# Patient Record
Sex: MALE | Race: WHITE
[De-identification: names, ages, dates, MRNs, and addresses within clinical notes are randomized per-mention and may not be internally consistent; named-entity substitution may affect disease eponyms.]

---

## 2018-02-23 ENCOUNTER — HOSPITAL ENCOUNTER (EMERGENCY)
Dept: HOSPITAL 83 - ED | Age: 78
Discharge: HOME | End: 2018-02-23
Payer: COMMERCIAL

## 2018-02-23 VITALS — WEIGHT: 232 LBS | BODY MASS INDEX: 35.16 KG/M2 | HEIGHT: 67.99 IN

## 2018-02-23 DIAGNOSIS — Z98.890: ICD-10-CM

## 2018-02-23 DIAGNOSIS — Z98.42: ICD-10-CM

## 2018-02-23 DIAGNOSIS — Z79.899: ICD-10-CM

## 2018-02-23 DIAGNOSIS — Z98.41: ICD-10-CM

## 2018-02-23 DIAGNOSIS — J31.0: Primary | ICD-10-CM

## 2018-02-28 ENCOUNTER — HOSPITAL ENCOUNTER (INPATIENT)
Dept: HOSPITAL 83 - ED | Age: 78
LOS: 8 days | Discharge: HOME | DRG: 885 | End: 2018-03-08
Attending: PSYCHIATRY & NEUROLOGY | Admitting: PSYCHIATRY & NEUROLOGY
Payer: COMMERCIAL

## 2018-02-28 VITALS — SYSTOLIC BLOOD PRESSURE: 156 MMHG | DIASTOLIC BLOOD PRESSURE: 62 MMHG

## 2018-02-28 VITALS — DIASTOLIC BLOOD PRESSURE: 92 MMHG | SYSTOLIC BLOOD PRESSURE: 156 MMHG

## 2018-02-28 VITALS — HEIGHT: 65.47 IN | BODY MASS INDEX: 37.12 KG/M2 | WEIGHT: 225.5 LBS

## 2018-02-28 VITALS — DIASTOLIC BLOOD PRESSURE: 100 MMHG

## 2018-02-28 VITALS — DIASTOLIC BLOOD PRESSURE: 62 MMHG | SYSTOLIC BLOOD PRESSURE: 156 MMHG

## 2018-02-28 DIAGNOSIS — K64.9: ICD-10-CM

## 2018-02-28 DIAGNOSIS — J30.9: ICD-10-CM

## 2018-02-28 DIAGNOSIS — Z79.899: ICD-10-CM

## 2018-02-28 DIAGNOSIS — Z98.42: ICD-10-CM

## 2018-02-28 DIAGNOSIS — I10: ICD-10-CM

## 2018-02-28 DIAGNOSIS — R39.12: ICD-10-CM

## 2018-02-28 DIAGNOSIS — N40.1: ICD-10-CM

## 2018-02-28 DIAGNOSIS — F41.9: ICD-10-CM

## 2018-02-28 DIAGNOSIS — E44.0: ICD-10-CM

## 2018-02-28 DIAGNOSIS — D53.9: ICD-10-CM

## 2018-02-28 DIAGNOSIS — E87.6: ICD-10-CM

## 2018-02-28 DIAGNOSIS — R74.0: ICD-10-CM

## 2018-02-28 DIAGNOSIS — Z83.3: ICD-10-CM

## 2018-02-28 DIAGNOSIS — F33.2: Primary | ICD-10-CM

## 2018-02-28 DIAGNOSIS — N39.0: ICD-10-CM

## 2018-02-28 DIAGNOSIS — Z98.41: ICD-10-CM

## 2018-02-28 LAB
ALBUMIN SERPL-MCNC: 2.7 GM/DL (ref 3.1–4.5)
ALP SERPL-CCNC: 101 U/L (ref 45–117)
ALT SERPL W P-5'-P-CCNC: 59 U/L (ref 12–78)
AMPHETAMINES UR QL SCN: < 1000
APAP SERPL-MCNC: < 2 UG/ML (ref 10–30)
APPEARANCE UR: (no result)
AST SERPL-CCNC: 37 IU/L (ref 3–35)
BACTERIA #/AREA URNS HPF: (no result) /[HPF]
BARBITURATES UR QL SCN: < 200
BASOPHILS # BLD AUTO: 0 10*3/UL (ref 0–0.1)
BASOPHILS NFR BLD AUTO: 0.2 % (ref 0–1)
BENZODIAZ UR QL SCN: < 200
BILIRUB UR QL STRIP: NEGATIVE
BUN SERPL-MCNC: 22 MG/DL (ref 7–24)
BZE UR QL SCN: < 300
CANNABINOIDS UR QL SCN: < 50
CHLORIDE SERPL-SCNC: 104 MMOL/L (ref 98–107)
COLOR UR: YELLOW
CREAT SERPL-MCNC: 1.07 MG/DL (ref 0.7–1.3)
EOSINOPHIL # BLD AUTO: 0 10*3/UL (ref 0–0.4)
EOSINOPHIL # BLD AUTO: 0.3 % (ref 1–4)
EPI CELLS #/AREA URNS HPF: (no result) /[HPF]
ERYTHROCYTE [DISTWIDTH] IN BLOOD BY AUTOMATED COUNT: 14.8 % (ref 0–14.5)
ETHANOL SERPL-MCNC: < 3 MG/DL (ref ?–3)
GLUCOSE UR QL: NEGATIVE
HCT VFR BLD AUTO: 34.9 % (ref 42–52)
HGB BLD-MCNC: 11.2 G/DL (ref 14–18)
HGB UR QL STRIP: NEGATIVE
KETONES UR QL STRIP: NEGATIVE
LEUKOCYTE ESTERASE UR QL STRIP: (no result)
LYMPHOCYTES # BLD AUTO: 1.2 10*3/UL (ref 1.3–4.4)
LYMPHOCYTES NFR BLD AUTO: 13.4 % (ref 27–41)
MCH RBC QN AUTO: 33.7 PG (ref 27–31)
MCHC RBC AUTO-ENTMCNC: 32.1 G/DL (ref 33–37)
MCV RBC AUTO: 105.1 FL (ref 80–94)
METHADONE UR QL SCN: < 300
MONOCYTES # BLD AUTO: 0.4 10*3/UL (ref 0.1–1)
MONOCYTES NFR BLD MANUAL: 4.8 % (ref 3–9)
NEUT #: 7 10*3/UL (ref 2.3–7.9)
NEUT %: 80.6 % (ref 47–73)
NITRITE UR QL STRIP: NEGATIVE
NRBC BLD QL AUTO: 0 10*3/UL (ref 0–0)
OPIATES UR QL SCN: < 300
PCP UR QL SCN: <  25
PH UR STRIP: 6 [PH] (ref 5–9)
PLATELET # BLD AUTO: 284 10*3/UL (ref 130–400)
PMV BLD AUTO: 9.4 FL (ref 9.6–12.3)
POTASSIUM SERPL-SCNC: 4.2 MMOL/L (ref 3.5–5.1)
PROT SERPL-MCNC: 7.6 GM/DL (ref 6.4–8.2)
RBC # BLD AUTO: 3.32 10*6/UL (ref 4.5–5.9)
RBC #/AREA URNS HPF: (no result) RBC/HPF (ref 0–2)
SODIUM SERPL-SCNC: 138 MMOL/L (ref 136–145)
SP GR UR: 1.02 (ref 1–1.03)
TSH SERPL DL<=0.005 MIU/L-ACNC: 1.04 UIU/ML (ref 0.36–4.75)
UROBILINOGEN UR STRIP-MCNC: 0.2 E.U./DL (ref 0.2–1)
WBC #/AREA URNS HPF: (no result) WBC/HPF (ref 0–5)
WBC NRBC COR # BLD AUTO: 8.7 10*3/UL (ref 4.8–10.8)

## 2018-03-01 VITALS — DIASTOLIC BLOOD PRESSURE: 82 MMHG

## 2018-03-01 VITALS — SYSTOLIC BLOOD PRESSURE: 149 MMHG | DIASTOLIC BLOOD PRESSURE: 62 MMHG

## 2018-03-01 LAB
25(OH)D3 SERPL-MCNC: 23 NG/ML (ref 30–100)
BASOPHILS # BLD AUTO: 0.1 10*3/UL (ref 0–0.1)
BASOPHILS NFR BLD AUTO: 0.6 % (ref 0–1)
BUN SERPL-MCNC: 19 MG/DL (ref 7–24)
CHLORIDE SERPL-SCNC: 103 MMOL/L (ref 98–107)
CHOLEST SERPL-MCNC: 119 MG/DL (ref ?–200)
CREAT SERPL-MCNC: 1.04 MG/DL (ref 0.7–1.3)
EOSINOPHIL # BLD AUTO: 0.2 10*3/UL (ref 0–0.4)
EOSINOPHIL # BLD AUTO: 1.9 % (ref 1–4)
ERYTHROCYTE [DISTWIDTH] IN BLOOD BY AUTOMATED COUNT: 14.9 % (ref 0–14.5)
HCT VFR BLD AUTO: 37.5 % (ref 42–52)
HDLC SERPL-MCNC: 43 MG/DL (ref 40–60)
HGB BLD-MCNC: 12.2 G/DL (ref 14–18)
LDLC SERPL DIRECT ASSAY-MCNC: 60 MG/DL (ref 9–159)
LYMPHOCYTES # BLD AUTO: 1.8 10*3/UL (ref 1.3–4.4)
LYMPHOCYTES NFR BLD AUTO: 20.7 % (ref 27–41)
MCH RBC QN AUTO: 34.2 PG (ref 27–31)
MCHC RBC AUTO-ENTMCNC: 32.5 G/DL (ref 33–37)
MCV RBC AUTO: 105 FL (ref 80–94)
MONOCYTES # BLD AUTO: 0.4 10*3/UL (ref 0.1–1)
MONOCYTES NFR BLD MANUAL: 5 % (ref 3–9)
NEUT #: 6.1 10*3/UL (ref 2.3–7.9)
NEUT %: 71 % (ref 47–73)
NRBC BLD QL AUTO: 0 % (ref 0–0)
PLATELET # BLD AUTO: 304 10*3/UL (ref 130–400)
PMV BLD AUTO: 9.7 FL (ref 9.6–12.3)
POTASSIUM SERPL-SCNC: 4.2 MMOL/L (ref 3.5–5.1)
RBC # BLD AUTO: 3.57 10*6/UL (ref 4.5–5.9)
SODIUM SERPL-SCNC: 141 MMOL/L (ref 136–145)
TRIGL SERPL-MCNC: 81 MG/DL (ref ?–150)
VITAMIN B12: 454 PG/ML (ref 247–911)
VLDLC SERPL CALC-MCNC: 16 MG/DL (ref 6–40)
WBC NRBC COR # BLD AUTO: 8.6 10*3/UL (ref 4.8–10.8)

## 2018-03-02 VITALS — DIASTOLIC BLOOD PRESSURE: 61 MMHG | SYSTOLIC BLOOD PRESSURE: 136 MMHG

## 2018-03-02 VITALS — DIASTOLIC BLOOD PRESSURE: 63 MMHG

## 2018-03-02 PROCEDURE — BD1BYZZ FLUOROSCOPY OF MOUTH/OROPHARYNX USING OTHER CONTRAST: ICD-10-PCS | Performed by: INTERNAL MEDICINE

## 2018-03-03 VITALS — DIASTOLIC BLOOD PRESSURE: 62 MMHG

## 2018-03-03 VITALS — SYSTOLIC BLOOD PRESSURE: 132 MMHG | DIASTOLIC BLOOD PRESSURE: 77 MMHG

## 2018-03-04 VITALS — DIASTOLIC BLOOD PRESSURE: 78 MMHG | SYSTOLIC BLOOD PRESSURE: 108 MMHG

## 2018-03-04 VITALS — SYSTOLIC BLOOD PRESSURE: 124 MMHG | DIASTOLIC BLOOD PRESSURE: 58 MMHG

## 2018-03-05 VITALS — DIASTOLIC BLOOD PRESSURE: 763 MMHG

## 2018-03-05 VITALS — SYSTOLIC BLOOD PRESSURE: 119 MMHG | DIASTOLIC BLOOD PRESSURE: 60 MMHG

## 2018-03-06 VITALS — DIASTOLIC BLOOD PRESSURE: 62 MMHG

## 2018-03-06 VITALS — DIASTOLIC BLOOD PRESSURE: 70 MMHG

## 2018-03-07 VITALS — DIASTOLIC BLOOD PRESSURE: 68 MMHG

## 2018-03-07 VITALS — DIASTOLIC BLOOD PRESSURE: 65 MMHG | SYSTOLIC BLOOD PRESSURE: 152 MMHG

## 2018-03-08 VITALS — DIASTOLIC BLOOD PRESSURE: 67 MMHG | SYSTOLIC BLOOD PRESSURE: 150 MMHG

## 2019-01-10 ENCOUNTER — HOSPITAL ENCOUNTER (INPATIENT)
Dept: HOSPITAL 83 - ED | Age: 79
LOS: 20 days | Discharge: NURSING, REHAB OR LONG-TERM CARE | DRG: 885 | End: 2019-01-30
Attending: PSYCHIATRY & NEUROLOGY | Admitting: PSYCHIATRY & NEUROLOGY
Payer: COMMERCIAL

## 2019-01-10 VITALS — DIASTOLIC BLOOD PRESSURE: 55 MMHG

## 2019-01-10 VITALS — SYSTOLIC BLOOD PRESSURE: 124 MMHG | DIASTOLIC BLOOD PRESSURE: 54 MMHG

## 2019-01-10 VITALS — DIASTOLIC BLOOD PRESSURE: 54 MMHG

## 2019-01-10 VITALS — DIASTOLIC BLOOD PRESSURE: 80 MMHG | SYSTOLIC BLOOD PRESSURE: 120 MMHG

## 2019-01-10 VITALS — DIASTOLIC BLOOD PRESSURE: 82 MMHG

## 2019-01-10 VITALS — DIASTOLIC BLOOD PRESSURE: 84 MMHG | SYSTOLIC BLOOD PRESSURE: 120 MMHG

## 2019-01-10 VITALS — DIASTOLIC BLOOD PRESSURE: 84 MMHG

## 2019-01-10 DIAGNOSIS — N40.0: ICD-10-CM

## 2019-01-10 DIAGNOSIS — I10: ICD-10-CM

## 2019-01-10 DIAGNOSIS — F34.1: ICD-10-CM

## 2019-01-10 DIAGNOSIS — G30.9: ICD-10-CM

## 2019-01-10 DIAGNOSIS — F33.2: Primary | ICD-10-CM

## 2019-01-10 DIAGNOSIS — Z83.3: ICD-10-CM

## 2019-01-10 DIAGNOSIS — K57.90: ICD-10-CM

## 2019-01-10 DIAGNOSIS — Z98.41: ICD-10-CM

## 2019-01-10 DIAGNOSIS — R73.03: ICD-10-CM

## 2019-01-10 DIAGNOSIS — Z79.899: ICD-10-CM

## 2019-01-10 DIAGNOSIS — E55.9: ICD-10-CM

## 2019-01-10 DIAGNOSIS — D53.9: ICD-10-CM

## 2019-01-10 DIAGNOSIS — E44.0: ICD-10-CM

## 2019-01-10 DIAGNOSIS — F02.80: ICD-10-CM

## 2019-01-10 DIAGNOSIS — R73.9: ICD-10-CM

## 2019-01-10 DIAGNOSIS — F41.9: ICD-10-CM

## 2019-01-10 DIAGNOSIS — Z98.42: ICD-10-CM

## 2019-01-10 LAB
ALBUMIN SERPL-MCNC: 2.9 GM/DL (ref 3.1–4.5)
ALP SERPL-CCNC: 94 U/L (ref 45–117)
ALT SERPL W P-5'-P-CCNC: 39 U/L (ref 12–78)
AMPHETAMINES UR QL SCN: < 1000
APPEARANCE UR: CLEAR
AST SERPL-CCNC: 31 IU/L (ref 3–35)
BARBITURATES UR QL SCN: < 200
BASOPHILS # BLD AUTO: 0 10*3/UL (ref 0–0.1)
BASOPHILS NFR BLD AUTO: 0.4 % (ref 0–1)
BENZODIAZ UR QL SCN: < 200
BILIRUB UR QL STRIP: NEGATIVE
BUN SERPL-MCNC: 24 MG/DL (ref 7–24)
BZE UR QL SCN: < 300
CANNABINOIDS UR QL SCN: < 50
CHLORIDE SERPL-SCNC: 102 MMOL/L (ref 98–107)
COLOR UR: YELLOW
CREAT SERPL-MCNC: 1.03 MG/DL (ref 0.7–1.3)
EOSINOPHIL # BLD AUTO: 0 10*3/UL (ref 0–0.4)
EOSINOPHIL # BLD AUTO: 0.4 % (ref 1–4)
EPI CELLS #/AREA URNS HPF: (no result) /[HPF]
ERYTHROCYTE [DISTWIDTH] IN BLOOD BY AUTOMATED COUNT: 14.7 % (ref 0–14.5)
GLUCOSE UR QL: NEGATIVE
HCT VFR BLD AUTO: 37 % (ref 42–52)
HGB BLD-MCNC: 12.1 G/DL (ref 14–18)
HGB UR QL STRIP: NEGATIVE
KETONES UR QL STRIP: NEGATIVE
LEUKOCYTE ESTERASE UR QL STRIP: NEGATIVE
LYMPHOCYTES # BLD AUTO: 1 10*3/UL (ref 1.3–4.4)
LYMPHOCYTES NFR BLD AUTO: 14.9 % (ref 27–41)
MCH RBC QN AUTO: 34.6 PG (ref 27–31)
MCHC RBC AUTO-ENTMCNC: 32.7 G/DL (ref 33–37)
MCV RBC AUTO: 105.7 FL (ref 80–94)
METHADONE UR QL SCN: < 300
MONOCYTES # BLD AUTO: 0.4 10*3/UL (ref 0.1–1)
MONOCYTES NFR BLD MANUAL: 6.1 % (ref 3–9)
NEUT #: 5.4 10*3/UL (ref 2.3–7.9)
NEUT %: 77.6 % (ref 47–73)
NITRITE UR QL STRIP: NEGATIVE
NRBC BLD QL AUTO: 0 10*3/UL (ref 0–0)
OPIATES UR QL SCN: < 300
PCP UR QL SCN: <  25
PH UR STRIP: 7 [PH] (ref 5–9)
PLATELET # BLD AUTO: 215 10*3/UL (ref 130–400)
PMV BLD AUTO: 9.7 FL (ref 9.6–12.3)
POTASSIUM SERPL-SCNC: 3.9 MMOL/L (ref 3.5–5.1)
PROT SERPL-MCNC: 7.8 GM/DL (ref 6.4–8.2)
RBC # BLD AUTO: 3.5 10*6/UL (ref 4.5–5.9)
SODIUM SERPL-SCNC: 136 MMOL/L (ref 136–145)
SP GR UR: 1.01 (ref 1–1.03)
UROBILINOGEN UR STRIP-MCNC: 1 E.U./DL (ref 0.2–1)
WBC #/AREA URNS HPF: (no result) WBC/HPF (ref 0–5)
WBC NRBC COR # BLD AUTO: 7 10*3/UL (ref 4.8–10.8)

## 2019-01-10 NOTE — NUR
MEDICATION LIST VERIFIED WITH DUSTY AT [facility]IA [facility]UI[facility].

## 2019-01-10 NOTE — PR
[place], [place]
 
                                      PROGRESS NOTE
 
        NAME: SHOLA FELIZ                 ACCT #: [id number]  
        UNIT #: [id number]                       ROOM: 311       
        DOCTOR: MINDY WHALEN MD              BIRTHDATE: [birth year]
 
 
DOS: 01/21/2019
 
INTERVAL NOTE
 
CHIEF COMPLAINT:  "I think I am feeling better, thank you."
 
SUMMARY OF THE VISIT:  The patient was interviewed as he was sitting in the
dining area.  He engaged readily in conversation.  He did report that he is
still very concerned about his wife and did not talk to her for a day or so. 
Nurses did report that she is not doing well.  This is very problematic as he is
very concerned for her wellbeing and is fearful of how he will react should she
die.  He is still voicing some thoughts of wanting to end it all if she should
indeed pass.  He does report though that the Ativan during the day is helping
his anxiety level, so he is not worrying as much.  He is also tolerating the
current medication regimen well.
 
MENTAL STATUS:  He is alert and oriented with time gaps.  Mood does seem to be
strongly trending towards euthymia, but is still somewhat suspect given the
circumstances.  There is no alfred or hypomania.  There is no gross psychosis. 
Short-term memory continues to be problematic.
 
PLAN:  I will go ahead and maximize out his Namenda, bringing it from 5 mg
b.i.d. to 10 mg b.i.d., support and monitor, engage in individual and chery
milieu activity, returning then to the least restrictive environment when
psychiatrically stable.
 
 
 
_________________________________
MINDY WHALEN MD
 
CM:PNTRANS
 
D: 01/21/19 1045                 
T: 01/21/19 1143
             
                                                            
MINDY WHALEN MD              
                                                            
01/21/19
1144
                              
interface

## 2019-01-10 NOTE — PR
[place], [place]
 
                                      PROGRESS NOTE
 
        NAME: SHOLA FELIZ                 ACCT #: [id number]  
        UNIT #: [id number]                       ROOM: 311       
        DOCTOR: ELLIE DUVAL CNP               BIRTHDATE: [birth year]
 
 
DOS: 01/20/2019
 
CHIEF COMPLAINT:  "I still feel sad at times."
 
SUMMARY OF THE VISIT:  The patient was interviewed in the dining room.  He
engaged readily in conversation with me.  He reports that he feels sleepy today.
 He feels that his mood is about the same.  He feels that his nerves have
somewhat improved.  Staff reports that he notices that the patient was somewhat
drowsy; however, he has been exhibiting no behaviors.  His appetite has been
okay.
 
MENTAL STATUS EXAMINATION:  The patient is alert and oriented.  He is pleasant
and cooperative with me.  No alfred or hypomania.  No delusions or paranoia.  No
psychotic symptoms.  No auditory or visual hallucinations.  His mood seems to be
trending towards euthymia.  His affect was congruent with mood.
 
PLAN:  Due to the patient having some drowsiness, it was decided to decrease the
patient's Ativan from 1 mg 3 times a day to 0.5 mg 4 times a day.  We will
continue to monitor the patient for effectiveness and for side effects.  We will
continue to encourage the patient to engage in individual and chery milieu
activity.  Continue fall and safety precautions.  Plan to return the patient to
the least restrictive environment once he is considered psychiatrically stable.
 
 
 
_________________________________
Ellie Duval CNP
 
CM:PNTRANS
 
D: 01/20/19 1452                 
T: 01/21/19 0017
             
                                                            
ELLIE DUVAL CNP               
                                                            
01/21/19
0918
                              
interface

## 2019-01-10 NOTE — PR
[place], [place]
 
                                      PROGRESS NOTE
 
        NAME: SHOLA FELIZ                 ACCT #: [id number]  
        UNIT #: [id number]                       ROOM: 311       
        DOCTOR: MINDY WHALEN MD              BIRTHDATE: [birth year]
 
 
DOS: 01/28/2019
 
CHIEF COMPLAINT:  "I feel little queasy in my belly."
 
SUMMARY OF THE VISIT:  The patient was interviewed as he was sitting, sipping
ginger ale.  He did report that he was feeling a little nauseated.  I discussed
with him how his weekend went and he smiled and nodded in approval.  I also told
him that there was a work in progress to attempt to get him admitted to [facility] in [place], so he could be with his wife.  He then smiled with a big
smile, I have seen him smile since he has been here.  He nodded in approval and
thanked me for assisting him.
 
MENTAL STATUS:  He is alert and oriented with some time gaps.  Mood does seem to
be strongly trending towards euthymia.  Affect is much more appropriate.  There
was no alfred or hypomania or psychosis noted.  Short term memory continues to be
poor, otherwise he is intact.
 
PLAN:  I will maintain his current psychotropic regimen.  Social service is
diligently working to attempt to get him to be admitted, so he can be with his
wife.  We will continue efforts along these ways, discharging then when
psychiatrically stable.
 
 
 
_________________________________
MINDY WHALEN MD
 
CM:PNTRANS
 
D: 01/28/19 0847                 
T: 01/28/19 1011
             
                                                            
MINDY WHALEN MD              
                                                            
01/28/19
1012
                              
interface

## 2019-01-10 NOTE — PR
[place], [place]
 
                                      PROGRESS NOTE
 
        NAME: SHOLA FELIZ                 ACCT #: [id number]  
        UNIT #: [id number]                       ROOM: 311       
        DOCTOR: MINDY WHALEN MD              BIRTHDATE: [birth year]
 
 
DOS: 01/22/2019
 
CHIEF COMPLAINT:  "I think I'm feeling better, my wife though is sick."
 
SUMMARY OF THE VISIT:  The patient was interviewed in the dining area, where he
was sitting with male peers.  He stopped and engaged in conversation with me. 
He does report that he is feeling somewhat better, but is still very concerned
about his wife.  He does not seem to totally grasp the severity of her illness
and states that she is very prone to developing cold and is ill because of that.
 In reality, she is on dialysis in an intensive care at [facility]. 
Family is very concerned about his reaction to her illness and possible further
decline.  He does seem to be calmer, however, and his depression does seem to be
somewhat under control.  He convincingly denies medication side effects.
 
MENTAL STATUS:  He is alert and oriented with time gaps.  Mood does seem to be
trending towards euthymia.  Affect is more appropriate.  There is no alfred,
hypomania or psychosis noted.  Short term memory has gaps.
 
PLAN:  I will maintain his current psychotropic regimen.  I have maxed out the
dose of both the Namenda and the Exelon, both impacting positively on ADL,
behavior, and cognition.  He is also maxed out on the Cymbalta dose at 60 mg
twice daily and has a reasonable dose of Ativan on board 0.5 mg 4 times daily
without the presence of any sedation or somnolence.  We will continue to support
and redirect, engage in individual and chery milieu activity, pastoral counseling
has been consulted as has Rebekah Betancourt, psychologist.
 
 
 
_________________________________
MINDY WHALEN MD
 
CM:PNTRANS
 
D: 01/22/19 0938                 
T: 01/22/19 1308
             
                                                            
MINDY WHALEN MD              
                                                            
01/22/19
1309
                              
interface

## 2019-01-10 NOTE — NUR
SHOLA FELIZ a 78 year old M admitted via
wheel chair from the EMERGENCY ROOM as a voluntary admission.
Arrived on unit at 1532.
ALLERGIES: NKDA.
Vital signs are: 97.8-75-18 124/54.
The client signed the following forms with stated understanding: Authorization
For The Release of Medical Information, Clothing List, Consent to Voluntary
Admission and Hospitalization, Consent and Release Forms/Receipt of Rights,
Acknowledgement of Advance Directive Information, Behavioral Health Consent
Form, and Informed Consent of Medications. [facility]. A search was conducted and hazardous articles were removed.
Client was oriented to the unit.
STARR GIBSON

## 2019-01-10 NOTE — PR
[place], [place]
 
                                      PROGRESS NOTE
 
        NAME: SHOLA FELIZ                 ACCT #: [id number]  
        UNIT #: [id number]                       ROOM: 311       
        DOCTOR: MINDY WHALEN MD              BIRTHDATE: [birth year]
 
 
DOS: 01/15/2019
 
CHIEF COMPLAINT:  "Oh, I just get so anxious and I just don't feel much like
doing things."
 
SUMMARY OF THE VISIT:  The patient was interviewed in the group therapy room. 
He was working with physical therapy initially and did seem to be following
commands fairly well with some minor difficulty noted.  As I engaged him in
conversation, he did experience significant word finding difficulty and did
outwardly appear anxious and quite nervous.  Some of this I do believe is
because of his cognitive slippage.  He does continue to endorse depression as
well as the anxiety.  He also reports no side effects from the medicines
themselves.
 
MENTAL STATUS:  He is alert and oriented to person, place, and approximate to
time.  Mood does still seem to be depressed with anxious overtones.  There is no
hypomania or alfred.  There are no active auditory or visual hallucinations.  No
delusions were voiced.  No paranoia is present.  Short-term memory continues to
be somewhat problematic.
 
PLAN:  I will maximize out the dose of Cymbalta bringing the total dose up to 60
mg twice daily.  Likewise, I will increase his Vistaril from 50 mg 3 times a day
to 50 mg 4 times a day in an effort to control his anxiety level.  Given the
fact that there is some significant cognitive loss, I will add Exelon patch 4.6
mg daily to help maintain or improve ADLs, behavior and cognition.  We will
continue to engage in individual and chery milieu activity with the ultimate plan
to return to the least restrictive environment when psychiatrically stable.
 
 
 
_________________________________
MINDY WHALEN MD
 
CM:PNTRANS
 
D: 01/15/19 0931                 
T: 01/16/19 0403
             
                                                            
MINDY WHALEN MD              
                                                            
01/16/19
0526
                              
interface

## 2019-01-10 NOTE — NUR
P-INCREASED ANXIETY AND PERIODS OF CONFUSION. PATIENT WITH PERIODS OF SHORT
TERM AND LONG TERM MEMORY DEFICITS. PATIENT VERBALIZED FEELING ANXIOUS AND
STATING "I HAVE BEEN FEELING LIKE THIS FOR QUITE SOME TIME". NO HALLUCINATIONS
OR DELUSIONS. NO HOMICIDAL OR SUICIDAL IDEATIONS.
I-REDIRECTION WITH 1:1 INTERACTION AND PRESENT REALITY ORIENTATION. EDUCATE
AND ENCOURAGE MEDICATION COMPLIANCE. DISCUSS TRIGGERS AND COPING SKILLS FOR
EPISODES OF ANXIETY
R-PATIENT REDIRECTABLE WITH 1:1 INTERACTION AND PRESENTING REALITY. PATIENT
VERBALIZED "MISSING HIS WIFE BECAUSE SHE WAS IN A NURSING HOME. PATIENT
MEDICATION COMPLIANT. PATIENT AFFECT IS SAD. PATIENT IS CALM, PURPOSEFUL,
ISOLATIVE, AND WITHDRAWN. PATIENT IS INTERACTIVE AT TIMES. PATIENT IS
AMBULATORY ON UNIT WITH STEADY GAIT. PATIENT PROVIDED NOURISHMENT, FLUIDS, AND
TOILETING. PATIENT CONTINENT OF BLADDER AT THIS TIME
P-CONTINUE TO MEDICATION COMPLIANCE, CONTINUE TO PRESENT REALITY, ENCOURAGE
GROUP THERAPY WHILE AWAKE

## 2019-01-10 NOTE — PR
[place], [place]
 
                                      PROGRESS NOTE
 
        NAME: SHOLA FELIZ                 ACCT #: [id number]  
        UNIT #: [id number]                       ROOM: 311       
        DOCTOR: MINDY WHALEN MD              BIRTHDATE: [birth year]
 
 
DOS: 01/17/2019
 
CHIEF COMPLAINT:  "Oh, I am just so anxious, I do not do really well for
breakfast."
 
SUMMARY OF THE VISIT:  The patient was interviewed as he was eating his
breakfast.  He had about half of his breakfast eaten.  He did engage readily in
conversation and reported to me that he is very concerned about his wife.  He is
aware that she is in the hospital upstairs.  I am not clear if he realizes she
is in intensive care.  He does report ongoing anxiety and depression.  The
current medication regimen has been ineffective at relieving his anxiety.  He is
sleeping better, however, with the current medication regimen.
 
MENTAL STATUS:  He is alert and oriented with time gaps.  Mood does seem to be
trending towards euthymia, but still is depressed with anxious overtones.  There
is no hypomania or alfred.  There is no gross psychosis.  Short term memory
continues to be problematic.
 
PLAN:  I will discontinue his Vistaril in lieu of Ativan 1 mg t.i.d.  I will
monitor for risk, benefit and excess sedation and somnolence.  I will maximize
out his dose of Exelon patch, bringing it from 9.5-13.3 mg daily.  We will
engage in individual and chery milieu activity.  We will also consult pastoral
counseling for spiritual support.
 
 
 
_________________________________
MINDY WHALEN MD
 
CM:PNTRANS
 
D: 01/17/19 0908                 
T: 01/17/19 2214
             
                                                            
MINDY WHALEN MD              
                                                            
01/17/19
2215
                              
interface

## 2019-01-10 NOTE — DS
[place], [place]
 
                                    DISCHARGE SUMMARY
 
        [facility]: SHOLA FELIZ                  [id number]  
        UNIT #: [id number]                        ROOM: 311       
        DOCTOR: MINDY WHALEN MD               BIRTHDATE: [birth year]
 
 
DOS: 01/30/2019
 
CHIEF COMPLAINT:  "I am just so depressed and anxious.  I don't know what to
do."
 
HISTORY OF PRESENT ILLNESS:  This is a 78-year-old white male known to me from a
previous admission here who had presented to the Emergency Room at [facility] with the chief complaint of marked increased anxiety
with significant depression.  The patient has not been eating well nor has he
been sleeping well.  He endorsed difficulty falling asleep, sleep continuity
disturbance and early morning awakening.  He has been overwhelmed with his
current living situation and has been living alone while his wife who is very
medically ill has resided at the [facility].  He did hint that
suicide was an option that he would consider, especially if something bad did
happen to his wife as he did not feel that he can go on without her.  The
patient is admitted to the psychiatric unit to rule out further organic factors,
to engage in individual and chery milieu activity and to stabilize on medication
while we determine the least restrictive environment to which he could be
discharged to.
 
SUMMARY OF THE HOSPITAL COURSE:  The patient was admitted to the unit where his
Remeron was discontinued due to ineffectiveness and the patient was started on
Cymbalta 30 mg at bedtime.  The hope was that the Cymbalta would be a
multitasker as he endorsed significant depression and anxiety as well as some
generalized body aches.  He did have a chief complaint that food no longer
tasted well and he did not feel like eating because food was so bland.  Zinc 220
mg was started in the morning to help improve his sense of taste.  Screening
examinations revealed him to have a low vitamin D level of 25.7, so vitamin D
5000 International Units daily was started.  His B12 level was low normal, so he
was started on B12 injections of 1000 mcg monthly.  His dose of the Cymbalta was
gradually increased from its 30 mg starting dose to its maximum dose of 60 mg
twice daily with good results.  The patient did have an elevated serum ammonia
level as well, so Chronulac 20 grams twice daily was started to lower his
hyperammonemia.  The patient was found to have significant short-term cognitive
deficits, which further exacerbated his depression and anxiety and made his
ability to totally function independently suspect.  Because of this, he needed a
lot of prompting with his ADLs.  Exelon patch and Namenda were ultimately
started and increased to their maximum dose of Exelon patch 13.3 mg a day and
Namenda 10 mg b.i.d.  Dr. Rebekah Betancourt, psychologist, did confirm a diagnosis
of Alzheimer's and did feel that a long-term placement was warranted.  Much of
his latter part of his stay focused on finding him an appropriate place,
especially one in which he and his wife can be together given their lengthy
marriage and his complaints of wanting to end it all if something should happen
to her.  Ultimately, the patient was able to be discharged to [facility] in [place] where his wife resided.
 
MENTAL STATUS AT DISCHARGE:  The patient is alert and oriented with time gaps. 
Mood does seem to be more euthymic.  Affect is more appropriate.  There is no
alfred or hypomania.  No gross psychosis.  Short-term memory does have gaps.
 
 
                              [place], [place]
 
                                    DISCHARGE SUMMARY
 
        NAME: SHOLA EFLIZ                  ACCT #: [id number]  
        UNIT #: [id number]                        ROOM: 311       
        DOCTOR: MINDY WHALEN MD               BIRTHDATE: [birth year]
 
 
DISCHARGE DIAGNOSES:  Major depression, recurrent and Alzheimer's dementia.
 
DISPOSITION:  The patient is to go to [facility] in [place].  All of his prescriptions have been printed and will be sent with him.
 At the time of discharge, there was no acute medical process and
psychiatrically he was stable.
 
 
 
_________________________________
MINDY WHALEN MD
 
CM:DISCHARG
 
D: 01/30/19 1052
T: 01/30/19 1222
    
                                                            
MINDY WHALEN MD              
                                                            
01/30/19
1222
                              
interface

## 2019-01-10 NOTE — PR
[place], [place]
 
                                      PROGRESS NOTE
 
        NAME: SHOLA FELIZ                 ACCT #: [id number]  
        UNIT #: [id number]                       ROOM: 311       
        DOCTOR: MINDY WHALEN MD              BIRTHDATE: [birth year]
 
 
DOS: 01/16/2019
 
INTERVAL NOTE
 
CHIEF COMPLAINT:  "I do get kind of anxious."
 
SUMMARY OF THE VISIT:  The patient was interviewed as he was sitting in the
dining area.  He had already completed his breakfast and he was dozing at the
table.  He awoke easily and engaged readily in conversation.  He did report he
is still feeling very anxious and is worried about many things including his
health, his wife's health and what will become of them.  He does have
significant word finding difficulty and his memory gaps are becoming more
prominent.
 
MENTAL STATUS:  He is alert and oriented to person, place, but not time.  Mood
seems to be trending towards euthymia.  Affect is more appropriate.  There is no
hypomania or alfred.  There are no gross psychotic symptoms.  He does process at
times slowly and short-term memory is problematic.
 
PLAN:  I will renew his p.r.n. Ativan should he require intervention.  I will
increase his Exelon patch from 4.6 to 9.5 mg a day to impact positively on ADL
maintenance, behavior and cognition.  Engage in individual and chery milieu
activity, returning then to the least restrictive environment when
psychiatrically stable.
 
 
 
_________________________________
MINDY WHALEN MD
 
CM:PNTRANS
 
D: 01/16/19 0956                 
T: 01/17/19 0312
             
                                                            
MINDY WHALEN MD              
                                                            
01/17/19
0313
                              
interface

## 2019-01-10 NOTE — PR
[place], [place]
 
                                      PROGRESS NOTE
 
        NAME: SHOLA FELIZ                 ACCT #: [id number]  
        UNIT #: [id number]                       ROOM: 311       
        DOCTOR: MINDY WHALEN MD              BIRTHDATE: [birth year]
 
 
DOS: 01/23/2019
 
CHIEF COMPLAINT:  "I am feeling better, thank you for asking breakfast, did not
agree with me though."
 
SUMMARY OF THE VISIT:  The patient was interviewed as he was sitting in the
dining area with peers.  He did report that he slept well and ate well, but
stated there was something about breakfast, but did not sit well with him.  He
was happy that he had an opportunity to talk with his wife last evening and
states that she does seem to be doing better.  He did express concern that she
always feels washed out after having dialysis, but did feel that she perked back
up when he talked to her last evening.  He is hopeful that he will be able to
leave the hospital and go into a facility to be able to be near his wife if not
actually in the same facility with her.
 
MENTAL STATUS:  He is alert and oriented with time gaps.  Mood does seem to be
improving.  Affect is more appropriate.  There is no hypomania, alfred or
psychosis.  Memory for the most part is intact with short-term memory gaps
noted.
 
PLAN:  I will maintain his current psychotropic regimen, continue to support and
redirect.  Engage in individual and chery milieu activity, returning to the least
restrictive environment when psychiatrically stable.
 
 
 
_________________________________
MINDY WHALEN MD
 
CM:PNTRANS
 
D: 01/23/19 0946                 
T: 01/23/19 1305
             
                                                            
MINDY WHALEN MD              
                                                            
01/23/19
1306
                              
interface

## 2019-01-10 NOTE — WRIGHTHP
[place], [place]
 
                               PATIENT HISTORY AND PHYSICAL EXAM
 
        [facility]: SHOLA FELIZ                  [id number]  
        UNIT #: [id number]                        ROOM: 311       
        DOCTOR: MINDY WHALEN MD               BIRTHDATE: [birth year]
 
 
DOS: 01/11/2019
 
INITIAL PSYCHIATRIC EVALUATION
 
CHIEF COMPLAINT:  "I am just so depressed and anxious.  I don't know what to
do."
 
HISTORY OF PRESENT ILLNESS:  This is a 78-year-old white male known to me from a
previous admission here, presenting to the Emergency Room at [facility] with a chief complaint of marked increased anxiety and depression.  The
patient reports he has not been eating well nor has he been sleeping well.  He
has been overwhelmed with this current situation of living alone while his wife
who is very medically ill is living at the [facility].  He does
hint that he has thought that suicide might be the way out, but stopped short of
saying if he will ever attempt it.  His current medication regimen is
ineffective and he has been feeling increasingly more despondent and depressed. 
He is admitted now to rule out organic factors, to stabilize on medication, to
engage in individual and chery milieu activity, returning to the least
restrictive environment when psychiatrically stable.
 
PAST MEDICAL HISTORY:  Remarkable for a bladder diverticulum, benign prostatic
hyperplasia, diverticulosis, hypertension, macrocytic anemia, moderate
protein-calorie malnutrition.
 
SOCIAL HISTORY:  He does not smoke cigarettes, use illicit drugs or drink
alcohol.
 
ALLERGIES:  He has no known allergies.
 
STRENGTHS:  Good verbal skills, ambulatory.
 
WEAKNESSES:  Significant depression with poor coping skills.
 
MENTAL STATUS:  He is alert and oriented with some time gaps.  Mood does seem to
be very depressed and despondent.  He is rather flat, blunted, and constricted. 
He endorses multiple neurovegetative symptoms.  There is no alfred or hypomania. 
There is no gross psychosis.  Short-term memory has some mild gaps, otherwise he
is intact.
 
DIAGNOSES:  Major depression, recurrent; dysthymic disorder; anxiety disorder,
not otherwise specified.
 
PLAN:  I have already discontinued his Remeron due to ineffectiveness and
started him on Cymbalta 30 mg at bedtime.  This dose can be increased during his
stay to decrease both his depression and his anxiety.  I will also start him on
zinc 220 mg in the morning as he reports that his sense of taste is grossly
distorted and this is one of the reasons why he is not eating.  Routine
screening examination shows him to have a low vitamin D level of 25.7.  I will
treat with vitamin D 5000 International Units daily.  His vitamin B12 level is
low normal, so I will go ahead and treat with vitamin B12 injection of 1000 mcg
 
                              [place], [place]
 
                               PATIENT HISTORY AND PHYSICAL EXAM
 
        NAME: SHOLA FELIZ                  ACCT #: [id number]  
        UNIT #: [id number]                        ROOM: 311       
        DOCTOR: MINDY WHALEN MD               BIRTHDATE: [birth year]
 
 
monthly.  His serum ammonia level is mildly elevated at 43.  I will treat with
Chronulac 20 grams twice daily and recheck a serum ammonia level soon to see if
this is impacting positively on the level.  We will engage in individual and
chery milieu activity, returning to the least restrictive environment when
psychiatrically stable.
 
 
 
_________________________________
MINDY WHALEN MD
 
CM:HISPHYS:PATIENT HISTORY AND PHYSICAL EXAMINATION
 
D: 01/11/19 0901
T: 01/11/19 0912
    
                                                            
MINDY WHALEN MD              
                                                            
01/11/19
0913
                              
interface

## 2019-01-10 NOTE — CON
[place], [place]
 
                                 REPORT OF CONSULTATION
 
        NAME: SHOLA FELIZ                   ACCT #: [id number]  
        UNIT #: [id number]                         ROOM: 311       
        DOCTOR: REMY PHD NIR         BIRTHDATE: [birth year]
 
 
DOS: 01/14/2019
 
HISTORYOF PRESENT ILLNESS:  The patient is a 78-year-old male referred by Dr. Parish for a competency and dementia evaluation.  At the present time, the
patient is on the [facility] at [facility].
 The patient is retired and formally worked in the steel mill.  He finished the
8th grade.  He is  and has a daughter who is in poor health and lives in
[place].  His wife is in poor health and is at [facility], which is a source of
anxiety and depression for the patient.  He denied alcohol, tobacco and illegal
drug use.  CT of his head on 01/02/2011, revealed extensive ischemic changes due
to small vessel atherosclerosis with the left side being worse than the right
side.
 
PAST MEDICAL HISTORY:  Bladder diverticulum, benign prostatic hyperplasia,
diverticulosis, hypertension, macrocytic anemia, moderate protein-calorie
malnutrition.
 
MEDICATIONS:  Flonase, Cymbalta, Vistaril, Mucinex, Flomax, cephalexin, vitamin
D, B12, zinc sulfate, Esidrix, Cozaar, Claritin, Prilosec, Ativan.
 
NEUROLOGIC EXAMINATION:  The patient was sitting comfortably, in no apparent
distress.  He was oriented to person, place, time and situation.  He could name
the current president, but could not name the past president or any current
events.  Eye contact and social skills were fair.  Mood was depressed and affect
was flat.  He denied current suicidal ideation, but acknowledged that killing
himself was something he has been thinking about a lot lately due to his wife's
illness.  He cited his Baptism beliefs as a protective factor and denied a
plan to kill himself.  He also denied homicidal ideation.  Speech was mumbled at
times.  Thought process was goal directed and thought content was negative for
apparent hallucinations and delusions.  Insight and judgment were fair.  He was
not able to discuss his medical conditions or name any of his medications.  He
states that his  is involved in his care and opted to take him to doctor's
appointments.  He also stated that his  is his POA.  The patient earned a
10/30 on the Haider Cognitive Assessment with an intact score being 26.  Mini
trials B and Necker cube copy were impaired.  Clock drawing was noteworthy for
ease of confusion.  He was not able to place the hands correctly.  Attention was
impaired with the patient not being able to perform 5 digits forward and 3
digits backwards.  He made several errors on a test of vigilance and could not
perform any correct serial 7 subtractions.  With respect to language abilities,
he was not able to correctly repeat any sentences, making several errors of
addition.  On a test of verbal fluency, he produced 10 words in 1 minute and
mistakenly called a rhinoceros and hippopotamus on the test of naming.  Verbal
abstraction was noteworthy for ease of confusion and concreteness on the test of
memory.  The patient was able to recall 2/5 and 3/5 words on immediate recall
trials.  He was not able to recall any words spontaneously after a brief delay. 
Category cues improved his performance to 1/5 words and multiple choice improved
his performance to 4/5 words.  Overall, the patient demonstrated significant
cognitive deficits in the areas of executive functioning, attention and memory. 
Contributing factors likely include his history of hypertension as well as his
depression.  Medication effects may also be a contributing factor.  His pattern
 
                              [place], [place]
 
                                 REPORT OF CONSULTATION
 
        NAME: SHOLA FELIZ                   ACCT #: [id number]  
        UNIT #: [id number]                         ROOM: [facility]       
        DOCTOR: REMY, PHD NIR         BIRTHDATE: [birth year]
 
 
of performance is most consistent with that a vascular dementia, which is
supported by the CT scan of his head in 2011.  In my opinion, given the
patient's significant cognitive deficits, he does not appear to be a competent
to make his own medical decisions at this time and would benefit from using his
POA or establishing surrogacy in [place].
 
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
major depressive disorder, recurrent, severe; unspecified anxiety disorder.
 
RECOMMENDATIONS:  The patient would benefit from utilizing his POA as
established for establishing surrogacy in [place].  Given the patient's
cognitive deficits, he may also benefit from increased care and supervision at
home.
 
Thank you very much for this consult.
 
 
 
_________________________________
Rebekah Betancourt, PhD
 
CM:CONSTR:REPORT OF CONSULTATION
 
D: 01/14/19 1754   T: 01/15/19                           
01/15/19     1709                                          interface

## 2019-01-10 NOTE — PR
[place], [place]
 
                                      PROGRESS NOTE
 
        NAME: SHOLA FELIZ                 ACCT #: [id number]  
        UNIT #: [id number]                       ROOM: 311       
        DOCTOR: MINDY WHALEN MD              BIRTHDATE: [birth year]
 
 
DOS: 01/25/2019
 
CHIEF COMPLAINT:  "Yeah, I think they told me my wife went into the nursing
home."
 
SUMMARY OF THE VISIT:  The patient was interviewed as he was completing his
breakfast.  He engaged readily in conversation.  He does appear calmer and less
anxious.  He is also less depressed.  He is very grateful that his wife is out
of the hospital and into a nursing home.  We talked about what options he had
and whether he would prefer going home or being with his wife, he did report to
me that he would prefer to be with his wife.
 
MENTAL STATUS:  He is alert and oriented to person, place, not necessarily time.
 Mood does seem to be more euthymic.  Affect is more appropriate.  There is no
alfred, hypomania or psychosis.  Short term memory continues to have gaps.
 
PLAN:  I will maintain his current psychotropic regimen, continue to engage in
individual and chery milieu activities, returning to the least restrictive
environment when psychiatrically stable.
 
 
 
_________________________________
MINDY WHALEN MD
 
CM:PNTRANS
 
D: 01/25/19 0903                 
T: 01/25/19 0928
             
                                                            
MINDY WHALEN MD              
                                                            
01/25/19
0929
                              
interface

## 2019-01-10 NOTE — PR
[place], [place]
 
                                      PROGRESS NOTE
 
        NAME: SHOLA FELIZ                 ACCT #: [id number]  
        UNIT #: [id number]                       ROOM: 311       
        DOCTOR: MINDY WHALEN MD              BIRTHDATE: [birth year]
 
 
DOS: 01/18/2019
 
CHIEF COMPLAINT:  "Thank you for arranging for me to see my wife."
 
SUMMARY OF THE VISIT:  The patient was interviewed as he was sitting, eating his
breakfast.  He reported to me that he was waiting for some sausage and was
hoping he would be able to eat that.  He thanked me profusely for arranging for
him to be able to visit his wife.  He was able to tell me that they took her to
another hospital.  He did not remember the name of the hospital or exactly what
is going on.  As opposed to yesterday, he seems to have recomposed himself. 
Yesterday, he had voiced some thoughts that if something happened to his wife,
he did not know if he could or would want to go on.  He is now at least
verbalizing more of a desire to get well, but he also thinks his wife is going
to be able to get well and they can be together.  We will continue to very
closely monitor the situation and continue to assess for suicidality and
lethality on his part.  He does on a positive note report that the anxiety level
is much less with the Ativan than it had been with the Vistaril, and he does not
appear sedated or somnolent in anyway.
 
MENTAL STATUS:  He is alert and oriented with significant time gaps and word
finding difficulty.  Mood does still seem to be depressed, but slightly better. 
There is no hypomania, alfred, or psychosis.  He does process slowly and
short-term memory is problematic.
 
PLAN:  I will augment Exelon now with Namenda 5 mg a day, targeting its maximum
dose over the next several days.  We will obtain pastoral counseling to offer
him more support and clearly monitor what is happening with his wife to
determine his response to her continued decline and illness.  We will engage in
individual and chery milieu activity, returning to the least restrictive
environment when psychiatrically stable.
 
 
 
_________________________________
MINDY WHALEN MD
 
CM:PNTRANS
 
D: 01/18/19 0925                 
T: 01/18/19 2354
             
                                                            
MINDY WHALEN MD              
                                                            
01/18/19
2359
                              
interface

## 2019-01-10 NOTE — PR
[place], [place]
 
                                      PROGRESS NOTE
 
        NAME: SHOLA FELIZ                 ACCT #: [id number]  
        UNIT #: [id number]                       ROOM: 311       
        DOCTOR: MINDY WHALEN MD              BIRTHDATE: [birth year]
 
 
DOS: 01/24/2019
 
INTERVAL NOTE
 
CHIEF COMPLAINT:  "Oh, I am okay, thank you."
 
SUMMARY OF THE VISIT:  The patient was interviewed as he was resting quietly in
bed.  He engaged in brief superficial conversation, reporting that he is feeling
better than when he came in.  He is also stating that his wife is doing better
and that she may be leaving the hospital soon.  He does have significant gaps in
his memory.  Nurses report the same that they have noticed cognitive issues as
well as incontinence.  The patient requires a great deal of support and
redirection to remember to take his medications and to remember when it is time
to eat and also to attend to his ADLs.
 
MENTAL STATUS:  He is alert and oriented to person, place, but not to time. 
Mood does seem to be more euthymic and his anxiety level likewise is lessened. 
There is no hypomania or alfred.  There are no auditory or visual hallucinations.
 No delusions, no paranoia.
 
PLAN:  I will maintain his current psychotropic regimen.  At this point, there
is a disconnect regarding what his potential power of  wants versus what
we are recommending.  It is unclear if this person is truly a power of 
for healthcare or just finances as  cleans what is actually the
legal ramifications of paperwork.  We are going to continue to strongly
recommend that the patient go into a long-term care facility if for nothing else
for 30 days as we see if the medications will impact positively on his ADLs,
behavior and cognition.
 
 
 
_________________________________
MINDY WHALEN MD
 
CM:PNTRANS
 
D: 01/24/19 1146                 
T: 01/24/19 1217
             
                                                            
MINDY WHALEN MD              
                                                            
01/24/19
1218
                              
interface

## 2019-01-10 NOTE — EKG
[place], [place]
 
                               ELECTROCARDIOGRAM REPORT
 
        NAME: SHOLA FELIZ                  ACCT #: [id number]  
        UNIT #: [id number]                        ROOM: 311       
        DOCTOR: TRENTON DRAFT REPORT          BIRTHDATE: [birth year]
 
 
 

 
 
                           [facility]
                                       
Test Date:    2019-01-10               Test Time:    12:53:37
Pat Name:     SHOLA FELIZ            Department:   
Patient ID:   [id number]             Room:         311
Gender:       M                        Technician:   [email or web address].KS
:          1940               Requested By: KOMAL VILLANUEVA
Order Number: [id number]      Reading MD:   Zaid Stewart MD
                                 Measurements
Intervals                              Axis          
Rate:         70                       P:            -1
IL:           191                      QRS:          -58
QRSD:         146                      T:            22
QT:           438                                    
QTc:          473                                    
                           Interpretive Statements
Sinus rhythm
RBBB and LAFB
Baseline wander in lead(s) I,III,aVL
Compared to ECG 2018 09:18:57
Atrial premature complex(es) no longer present
 
Electronically Signed On 1- 17:32:21 PST by Zaid Stewart MD
 
CM:EKGRPT:ELECTROCARDIOGRAM REPORT
 
D: 01/10/19 1253
T: 01/10/19 1732
    
KOMAL CHOWDHURY DRAFT REPORT         
KOMAL RUST

## 2019-01-10 NOTE — PR
[place], [place]
 
                                      PROGRESS NOTE
 
        NAME: SHOLA FELIZ                 ACCT #: [id number]  
        UNIT #: [id number]                       ROOM: 311       
        DOCTOR: ELLIE DUVAL CNP               BIRTHDATE: [birth year]
 
 
DOS: 01/19/2019
 
CHIEF COMPLAINT:  "I'm having a problem with my taste buds but I'm trying to
eat the best that I can."
 
SUMMARY OF THE VISIT:  The patient was interviewed as he sat in the dining room.
 He engaged readily in conversation with me.  The patient continues to feel
anxious at times.  He reports that his appetite is fair, that he has been
sleeping okay.  Staff reports that the patient's wife was transported to the ICU
at [facility]; however, the patient is unaware of why she was
transferred and her status.  He does continue to be anxious.
 
MENTAL STATUS EXAMINATION:  The patient was alert and oriented to person, place
and time.  He was pleasant and cooperative.  No alfred or hypomania.  No
delusions or paranoia noted.  No auditory or visual hallucinations noted.  His
mood was calm.  Affect congruent with mood.
 
PLAN:  We will increase the patient's Namenda to 5 mg twice a day.  We will
continue to encourage the patient to engage in individual and chery milieu
activity.  We will continue fall and safety precautions.  We will plan to return
the patient to the least restrictive environment once he is considered
psychiatrically stable.
 
 
 
_________________________________
Ellie Duval CNP
 
CM:PNTRANS
 
D: 01/19/19 1138                 
T: 01/19/19 2332
             
                                                            
ELLIE DUVAL CNP               
                                                            
01/19/19
2333
                              
interface

## 2019-01-10 NOTE — NUR
PATIENT ALERT AND ORIENTED WITH PERIODS OF CONFUSION. PATIENT WITH NO
RESPIRATORY DISTRESS. PATIENT VERBALIZING BEING ANXIOUS AND STATES "I WISH MY
WIFE WAS NOT IN THE NURSING HOME. WE HAVE BEEN  FOR OVER 40 YEARS AND I
MISS HER." PATIENT VERBALIZES THAT HE BELIEVES THAT HIS ANXIETY "HAS GOTTEN
WORSE SINCE SHE WENT INTO THE NURSING HOME". PATIENT ALSO VERBALIZES THAT HE
"IS CONCERNED ABOUT WIFE AND SHE IS ALSO A DIALYSIS PATIENT ON MONDAY,
WEDNESDAY, AND FRIDAY". PATIENT MEDICATION COMPLIANT. CONTINENT OF BLADDER AT
THIS TIME. PATIENT PROVIDED FLUIDS AND NOURISHMENT. SEE [facility] FLOW SHEET FOR
SPECIFIC MONITORING

## 2019-01-10 NOTE — NUR
PRN ATIVAN INEFFECTIVE AT THIS TIME. PT CONTINUES APPROACHING STAFF MULTIPLE
TIMES WITH C/O ANXIETY. EMOTIONAL SUPPORT PROVIDED EACH TIME. PT GIVEN SPACE
TO VENT FEELINGS. WILL CONTINUE TO MONITOR FOR EFFECTIVENESS.

## 2019-01-10 NOTE — PR
[place], [place]
 
                                      PROGRESS NOTE
 
        NAME: SHOLA FELIZ                 ACCT #: [id number]  
        UNIT #: [id number]                       ROOM: 311       
        DOCTOR: MINDY WHALEN MD              BIRTHDATE: [birth year]
 
 
DOS: 01/29/2019
 
CHIEF COMPLAINT:  "I'm feeling a little better, I hope I get to be with my wife
soon."
 
SUMMARY OF THE VISIT:  The patient was interviewed as he was eating his
breakfast.  He did report to me that his belly was not bothering him as much as
it had been the previous 2 days.  He also reported to me that he was happy to
hear that he might be going to [place] to be able to be with his wife.  That
is the one thing that he has been most consistent and fixated on wanting to be
with her at this point in time.  He voiced no other complaints, reporting good
sleep and appetite.  He notes no suicidal thoughts, homicidal thoughts or any
self-injurious thoughts at this point.  He also convincingly denies any
medication side effects.
 
MENTAL STATUS:  He is alert and oriented to person, place, approximate to time. 
Mood does seem to be strongly trending towards euthymia.  Affect is more
appropriate.  There is no hypomania, alfred or psychosis.  Short term memory
continues to have gaps, otherwise he is relatively fully intact.
 
PLAN:  I will maintain his current psychotropic regimen, continue to support and
monitor, engage in individual and chery milieu activity.  We are waiting
paperwork from [place] to be able to process him further to be able to go
to [place].
 
 
 
_________________________________
MINDY WHALEN MD
 
CM:PNTRANS
 
D: 01/29/19 0921                 
T: 01/30/19 0048
             
                                                            
MINDY WHALEN MD              
                                                            
01/30/19
0049
                              
interface

## 2019-01-10 NOTE — NUR
PT OVERWHELMINGLY ANXIOUS AND VISIBLY SHAKING. UNABLE TO CALM OR REDIRECT.
EMOTIONAL SUPPORT PROVIDED WITH MINIMAL EFFECT. PT REQUESTS "SOMETHING TO CALM
DOWN." PRN ATIVAN 1MG PO GIVEN AT THIS TIME. WILL CONTINUE TO MONITOR FOR
EFFECTIVENESS.

## 2019-01-10 NOTE — PR
[place], [place]
 
                                      PROGRESS NOTE
 
        NAME: SHOLA FELIZ                 ACCT #: [id number]  
        UNIT #: [id number]                       ROOM: 311       
        DOCTOR: MINDY WHALEN MD              BIRTHDATE: [birth year]
 
 
DOS: 01/14/2019
 
CHIEF COMPLAINT:  "Oh, I still am feeling anxious.  My sinuses were bothering
me."
 
SUMMARY OF THE VISIT:  The patient was interviewed as he was sitting, eating his
breakfast.  He seemed disjointed and fragmented and he had significant word
finding difficulty.  He does appear to be somewhat confused and forgetful. 
Nurses report that this seems to be exaggerated from his previous admission here
and seems to be worsening.  He also outwardly seems very anxious, whether the
anxiety is related to the cognitive issues is unclear.
 
MENTAL STATUS:  He is alert and oriented to person, place, not necessarily time.
 Mood does seem to be more euthymic.  Affect is more appropriate.  There is no
alfred, hypomania or psychosis.  Short term memory does have gaps.
 
PLAN:  I will increase his Cymbalta to 30 mg in the morning and 60 mg at night
to impact positively on his symptomatology.  I will consult Rebekah Betancourt,
psychologist, to determine if there is dementia here and whether or not he is
competent to make decisions.
 
Discussed the case at length with  to discuss with him later his
options regarding placement.  We will engage in individual and chery milieu
activity, returning to the least restrictive environment when psychiatrically
stable.
 
 
 
_________________________________
MINDY WHALEN MD
 
CM:PNTRANS
 
D: 01/14/19 0849                 
T: 01/14/19 0916
             
                                                            
MINDY WHALEN MD              
                                                            
01/15/19
0411
                              
interface Name band;

## 2019-01-10 NOTE — NUR
PM GROUP/CREATIVE OUTLET
PT HAS JUST BEEN ADMITTED TO THE UNIT AND IS WITH NURSE TO BE ASSESSED AND
ORIENTED TO THE UNIT. WILL ATTEMPT PT ASSESSMENT AND GOAL SETTING TOMORROW.

## 2019-01-11 VITALS — DIASTOLIC BLOOD PRESSURE: 70 MMHG

## 2019-01-11 VITALS — DIASTOLIC BLOOD PRESSURE: 73 MMHG | SYSTOLIC BLOOD PRESSURE: 130 MMHG

## 2019-01-11 LAB
25(OH)D3 SERPL-MCNC: 25.7 NG/ML (ref 30–100)
ALBUMIN SERPL-MCNC: 2.7 GM/DL (ref 3.1–4.5)
ALP SERPL-CCNC: 82 U/L (ref 45–117)
ALT SERPL W P-5'-P-CCNC: 36 U/L (ref 12–78)
AST SERPL-CCNC: 28 IU/L (ref 3–35)
BASOPHILS # BLD AUTO: 0 10*3/UL (ref 0–0.1)
BASOPHILS NFR BLD AUTO: 0.3 % (ref 0–1)
BUN SERPL-MCNC: 23 MG/DL (ref 7–24)
CHLORIDE SERPL-SCNC: 105 MMOL/L (ref 98–107)
CHOLEST SERPL-MCNC: 125 MG/DL (ref ?–200)
CREAT SERPL-MCNC: 0.98 MG/DL (ref 0.7–1.3)
EOSINOPHIL # BLD AUTO: 0.1 10*3/UL (ref 0–0.4)
EOSINOPHIL # BLD AUTO: 1.9 % (ref 1–4)
ERYTHROCYTE [DISTWIDTH] IN BLOOD BY AUTOMATED COUNT: 14.5 % (ref 0–14.5)
HCT VFR BLD AUTO: 35 % (ref 42–52)
HDLC SERPL-MCNC: 38 MG/DL (ref 40–60)
HGB BLD-MCNC: 11.5 G/DL (ref 14–18)
LDLC SERPL DIRECT ASSAY-MCNC: 72 MG/DL (ref 9–159)
LYMPHOCYTES # BLD AUTO: 1.4 10*3/UL (ref 1.3–4.4)
LYMPHOCYTES NFR BLD AUTO: 20.3 % (ref 27–41)
MCH RBC QN AUTO: 34.8 PG (ref 27–31)
MCHC RBC AUTO-ENTMCNC: 32.9 G/DL (ref 33–37)
MCV RBC AUTO: 106.1 FL (ref 80–94)
MONOCYTES # BLD AUTO: 0.3 10*3/UL (ref 0.1–1)
MONOCYTES NFR BLD MANUAL: 4.4 % (ref 3–9)
NEUT #: 5 10*3/UL (ref 2.3–7.9)
NEUT %: 72.7 % (ref 47–73)
NRBC BLD QL AUTO: 0 % (ref 0–0)
PLATELET # BLD AUTO: 190 10*3/UL (ref 130–400)
PMV BLD AUTO: 10.1 FL (ref 9.6–12.3)
POTASSIUM SERPL-SCNC: 3.9 MMOL/L (ref 3.5–5.1)
PROT SERPL-MCNC: 7.3 GM/DL (ref 6.4–8.2)
RBC # BLD AUTO: 3.3 10*6/UL (ref 4.5–5.9)
SODIUM SERPL-SCNC: 142 MMOL/L (ref 136–145)
TRIGL SERPL-MCNC: 77 MG/DL (ref ?–150)
TSH SERPL DL<=0.005 MIU/L-ACNC: 1.57 UIU/ML (ref 0.36–4.75)
VITAMIN B12: 388 PG/ML (ref 247–911)
VLDLC SERPL CALC-MCNC: 15 MG/DL (ref 6–40)
WBC NRBC COR # BLD AUTO: 6.8 10*3/UL (ref 4.8–10.8)

## 2019-01-11 NOTE — NUR
ASSESSMENT
SPENT TIME WITH PT COMPLETING ASSESSMENT AND 1:1 TO EXPRESS FEELINGS. PT IS
PREOCCUPIED WITH FOOD INTAKE STATING, "I JUST DON'T KNOW WHAT I SHOULD EAT" PT
IS SUFFERING FROM ANXIETY DUE TO THESE ISSUES AND WIFE BEING IN A NURSING
HOME. PT BELIEVES WIFE WILL RETURN HOME AND BE ABLE TO "TAKE CARE OF HIM
AGAIN."  PT EXPRESSED WILLINGNESS TO LEARN COPING SKILLS TO DEAL WITH ANXIOUS
THOUGHTS.

## 2019-01-11 NOTE — NUR
P: DEPRESSED MOOD, ISOALTIVE TO SELF
I: ENCOURAGE PT PARTICIPATE IN GROUPS/ACTIVITIES, ENCOURAGE PT TO SOCIALIZE
WITH OTHERS, ENCOURAGE MED COMPLIANCE AND PROVIDE MED EDUCATION
R: PT STATED "OH I JUST DON'T FEEL LIKE IT TODAY, I'M A LITTLE DOWN"
P: CONTINUE TO PARTICIAPTE IN GROUPS/ACTIVITES, ENCOURAGE SOCIALIZATION,
PROVIDE EMOTIONAL SUPPORT AND 1:1 FOR PT TO VOICE FEELINGS
 
PT ALERT TO PERSON, PLACE, TIME AND SITUATION. PT MED COMPLIANT WITHOUT
DIFFICULTY, MED EDUCATION PROVIDED. PT CALM, ANXIOUS AND RESTLESS AT TIMES. NO
HALLUCINATIONS OR DELUSIONS NOTED. PT DENIES ANY HOMICIDAL/SUICIDAL THOUGHTS.
PT AMBULATORY THROUGHOUT UNIT, GAIT STEADY. PT CONTINENT OF BOWEL AND BLADDER.

## 2019-01-11 NOTE — NUR
PERSONAL DAILY GOAL
DECISION MAKING AND LEVEL OF ANXIETY. PT PREOCCUPIED WITH DECIDING WHAT TO EAT
AND THIS CAUSES ANXIETY.

## 2019-01-11 NOTE — NUR
INSURANCE NOTIFIED OF ADMISSION.  PT IS APPROVED 1/10- 1/1 AUTH NUMBER
[id number].   NEXT REVIEW DATE 1/15 WITH JENNIFER [phone]  EXT [id number].

## 2019-01-11 NOTE — NUR
Treatment Plan meeting with Dr. Parish, RN, AT, SW and Discharge Planner. Plan
for discharge next week. Will discuss with patient discharge Planning goals
today.

## 2019-01-11 NOTE — NUR
PM GROUP/GAMES AND SOCIALIZATION
PT ATTENDED AND PARTICIPATED IN ALL GROUP ACTIVITIES UNTIL CALLED OUT BY SW
FOR ASSESSMENT. PT EXHIBITED NO SIGNS OF ANXIETY DURING GROUP. PT WILL
CONTINUE TO ATTEND AND PARTICIPATE IN GROUP ACTIVITIES.

## 2019-01-11 NOTE — NUR
P: WITHDRAWN AND ISOALATIVE
I: eNCOURAGED TO VERBALIZE FEELINGS OF HOPELESSNESS AND HELPLESSNESS.
eNCOURAGE INTERACTION WITH OTHERS. ENCOURAGE MED COMPLIANCE. AND TO VERBALIZE
FEELINGS.
R: PT STATES HE JUST DOESN'T FEEL LIKE TALKING RIGHT NOW.
P: CONTINUE TO ENCOURAGE VERBALIZATION OF FEELINGS AND MED COMPLIANCE.
ENCOURAGE INTERACTION WITH PEERS.
 
PATIENT EXPRESSED FEELINGS OF HOPELESSNESS DUE TO THE FACT THAT HIS WIFE IS IN
A NURSING HOME AND HE CAN'T BE WITH HER AS MUCH AS HE WANTS. HE IS MISSING
HER. iS ALERT AND VERBAL. AMBULATES AD HIRA AND IS CONTINENT.  NO SI/HI AT
PRESENT.

## 2019-01-12 VITALS — SYSTOLIC BLOOD PRESSURE: 127 MMHG | DIASTOLIC BLOOD PRESSURE: 70 MMHG

## 2019-01-12 VITALS — DIASTOLIC BLOOD PRESSURE: 63 MMHG

## 2019-01-12 NOTE — NUR
AM GROUP/EXERCISE/FOCUS
PT ATTENDED AND PARTICIPATED IN EXERCISES. PT ATTEMPTED TO PARTICIPATE IN
FOCUS GROUP BUT CONTINUED TO FALL ASLEEP ON AND OFF THROUGHOUT. PT DID NOT
EXPRESS ANY ANXIETY AT THIS TIME. PT WILL CONTINUE TO ATTEND GROUP AND WILL
PARTICIPATE TO BEST OF ABILITY.

## 2019-01-12 NOTE — NUR
P--ISOLATION..HOPELESS
I--DISCUSSED COPING SKILLS, ALLOWED HIM TO TALK ABOUT HIS WIFE AND HIS FEARS
MEDICATE PER ORDERS.  EMOTIONAL SUPPORT PROVIDED
R- I JUST DON'T KNOW HOW TO HELP HER. I DID ENJOY TALKING TO MY FAMILY
P--CONTINUE TO MONITOR MOOD STABILITY AND PROVIDE EMOTIONAL SUPPORT

## 2019-01-12 NOTE — NUR
P- ISOLATIVE TO ROOM
 
I- 1:1 INTERACTION AND EMOTIONAL SUPPORT PROVIDED. ENCOURAGED TO ATTEND GROUP
AND INTERACT WITH PEERS. OFFERED DIVERSIONAL ACTIVITIES, SUCH AS WATCHING TV.
ENGAGE IN CONVERSATION. MEDICATIONS PROVIDED WITH EDUCATION. ASSESS FOR
FEELINGS OF SADNESS, SI/HI.
 
R- PATIENT DENIES FEELING SAD, SI/HI. STATED THAT HE IS FEELING BETTER TODAY.
INTERACTIVE WITH STAFF, NOT SO MUCH WITH PEERS. SAT IN THE DINING ROOM FOR
ABOUT TWO HOURS WATCHING TV WITH PEERS. PARTICIPATING IN GROUP. ENGAGED IN
CONVERSATION WITH THIS NURSE AND EXPRESSED CONCERNS ABOUT HIS WIFE, STATED
THAT THE BUILD UP OF THE SITUATION CAUSED HIM TO GET DEPRESSED; HOWEVER,
FEELING ALOT BETTER TODAY. CURRENTLY TALKING ON THE PHONE IN QUIET ROOM.
 
P- CONTINUE TO MONITOR FOR FEELINGS OF DEPRESSION, SI/HI. ENCOURAGE GROUP AND
ENGAGE IN CONVERSATIONS. PROVIDE MEDICATOINS ON TIME WITH EDUCATION. 1:1 WHEN
NECESSARY. Q15 MINUTE CHECKS MAINTAINED FOR SAFETY.

## 2019-01-12 NOTE — NUR
PERSONAL DAILY GOAL
PT STATES "I NEED TO WORK ON FOCUSING BETTER" PT GOAL IS TO TRY TO FOCUS AND
PAY ATTENTION TO THINGS GOING ON AROUND HIM MORE.

## 2019-01-13 VITALS — DIASTOLIC BLOOD PRESSURE: 77 MMHG | SYSTOLIC BLOOD PRESSURE: 110 MMHG

## 2019-01-13 VITALS — DIASTOLIC BLOOD PRESSURE: 64 MMHG

## 2019-01-13 NOTE — NUR
CALLED OUT ASKING FOR SOMETHING FOR HIS SINUSES. REMINDED HIM THAT I GAVE HIM
2 MUCINEX TABLETS (BLUE AND WHITE) WITH HIS NIGHTLY MEDICATIONS. HE REPLIED OH
OK I GUESS I JUST GOT THEM MIXED UP.

## 2019-01-13 NOTE — NUR
PM GROUP/EXERCISES/BINGO/ART
PT ATTENDED AND PARTICIPATED IN GROUP. PT PLEASANT BUT KEPT DOZING OFF
THROUGHOUT GROUP. PT NOT VERY TALKATIVE DID NOT EXPRESS ANY ANXIETY OR
HOPPELESSNESS AT THIS TIME. PT WILL CONTINUE TO ATTEND AND PARTICIPATE IN
FUTURE GROUP SESSIONS.

## 2019-01-13 NOTE — NUR
P---HOPLESS AND PREOCCUPATION
I--1:1 SO CLIENT CAN VERBALIZE FEELINGS, NEEDS AND CONCERNS. PROVIDE EMOTIONAL
SUPPORT AND ENCOURAGE USE OF COPING SKILLS. REASSURANCE THAT DOCTORS ARE AWARE
OF HIS CONCERNS
R- MY DAY COULD HAVE BEEN BETTER OR WORSE. MY SINUSES ARE STILL STUFFY
P- MONITOR FOR CHANGES IN MOOD OR MEDICAL STATUS. CONTINUE EMOTIONAL SUPPORT.
MEDICATE PER ORDERS. MONITOR FOR SAFETY

## 2019-01-13 NOTE — NUR
PATIENT IS ALERT TO PERSON, PLACE AND TIME; ABLE TO VOICE NEEDS. MOOD IS
HOPELESS/HELPLESS, PREOCCUPIED WITH HAVING A STUFFY NOSE.  REPORTS HAVING
ANXIETY; PROVIDED MEDICATION EDUCATION TO ENSURE PATIENT WAS TAKING
MEDICATIONS FOR ANXEITY. DENIES ANY HALLUCINATIONS, DELUSIONS, HI/SI OR PAIN.
1 PERSON ASSIST, VERBAL CUEING WITH ACTIVITIES OF DAILY LIVING, CONTINENT OF
BOWEL AND BLADDER. SET UP FOR MEALS, INTAKES ARE GOOD WITH ADEQUATE FLUIDS.
AMBULATORY WITH STEADY GAIT. INTERACTIVE WITH STAFF AND OTHER PATIENTS. 
VISITING PATIENT TODAY. COMPLIANT WITH MEDICATIONS, Q 15 MINUTE SAFETY CHECKS
MAINTAINED. CONTINUE TO MONITOR FOR MOOD CHANGES, PROVIDE ONE ON ONE,
DIVERSIONAL ACTIVITIES AND REDIRECTION AS NEEDED.
P:  FLAT AFFECT AND REPORTS HAVING ANXIETY
I:  PROVIDE ONE ON ONE, MEDICATION EDUCATION, ACTIVITY,  VISIT
R:  EFFECTIVE
P:  CONTINUE TO MONITOR FOR MOOD CHANGES AND PROVIDE NON PHARMOLOGICAL
INTERVENTIONS

## 2019-01-13 NOTE — NUR
DISCUSSED CLIENTS ANXIETY ABOUT WIFE AND BEING AT [employer].  ENCOURAGED HIM TO
INTERACT WITH PEERS AND NOT ISOLATE SELF

## 2019-01-13 NOTE — NUR
PERSONAL DAILY GOAL
PT STATES "I JUST WANT TO MAKE IT THROUGH TODAY" WHEN ASKED ABOUT DAILY GOAL.
PT STATES "I WILL TRY NEW COPING SKILLS TO HELP". PT GOAL IS TO UTILIZE NEW
COPING SKILLS.

## 2019-01-14 VITALS — SYSTOLIC BLOOD PRESSURE: 117 MMHG | DIASTOLIC BLOOD PRESSURE: 64 MMHG

## 2019-01-14 VITALS — DIASTOLIC BLOOD PRESSURE: 80 MMHG

## 2019-01-14 NOTE — NUR
PATIENT STATES THAT HIS HIP IS FEELING A LOT BETTER. AMBULATING MORE AT EASE.
PRN TYLENOL EFFECTIVE.

## 2019-01-14 NOTE — NUR
P: ISOLATIVE TO SELF, REFUSING MEALS, INCREASED ANXIETY
I: ENCOURAGE PT TO PARTICIPATE IN GROUPS/ACTIVITIES, ENCOUARGE SOCIALIZATION
WITH PEERS AND STAFF, ENCOURAGE PO INTAKE, OFFERED PT MULTIPLE OTHER FOODS OF
HIS CHOICE, PROVIDE EMOTIONAL SUPPORT AND 1:1 FOR PT TO VOICE FEELIGNS
R: PT CONSUMED 10% OF BREAKFAST AND 50% OF LUNCH, PT STATED "I JUST DON'T LIKE
IT, MY TASTE BUDS ARE ALL MESSED UP." WHEN OFFERED MULTIPLE OTHER FOODS, PT
STATED "I DON'T LIKE THAT."
P: CONTINUE TO ENCOURAGE PO INTAKE, PROVIDE EMOTIONAL SUPPORT AND 1:1 FOR PT
TO VOICE FEELINGS
 
PT ALERT TO PERSON,PLACE, TIME AND SITUATION, SHORT TERM MEMORY DEFICITS AND
WORD FINDING DIFFICULTY NOTED AT TIMES.  PT MED COMPLIANT WITHOUT
DIFFICULTY, MED EDUCATION PROVIDED. PT CALM, MOOD IS STABLE,ANXIOUS AND
RESTLESS AT TIMES. NO HALLUCINATIONS OR DELUSIONS NOTED. PT DENIES ANY
SUICIDAL THOUGHTS, VERBALLY CONTRACTED FOR SAFETY IF SUCH THOUGHTS ARISE. PT
AMBUALTORY THROUGHOUT UNIT, GAIT STEADY. PT CONTINENT OF BOWEL AND BLADDER.

## 2019-01-14 NOTE — NUR
AM GROUP/EXERCISES/GAMES
PT ATTENDED AND PARTICIPATED DURING GROUP. PT FELL ASLEEP IN BETWEEN TURNS
DURING GAME BUT WOULD WAKE UP FOR TURN. PT DID NOT DISPLAY OR EXPRESS ANY
ANXIETY AT THIS TIME. PT WILL CONTINUE TO ATTEND AND PARTICIPATE DURING GROUP.

## 2019-01-14 NOTE — NUR
PATIENT GOT UP FROM THE CHAIR AND FAVORING LEFT LEG WHEN AMBULATING. PATIENT
STATED HE SAT ON IT WRONG AND IT WAS BOTHERING HIM. STATED PAIN WAS A 7 OUT OF
10. TYLENOL 650MG GIVEN PER PRN ORDER.

## 2019-01-14 NOTE — NUR
PM GROUP/ART/MUSIC
PT ATTENDED GROUP BUT CHOSE NOT TO PARTICIPATE. PT FELL ASLEEP IN [place] CHAIR
[facility] PT DID NOT EXPRESS ANY ANXIETY AT THIS
TIME.PT WILL CONTINUE TO ATTEND AND PARTICIPATE IN GROUP TO BEST OF ABILITY.

## 2019-01-14 NOTE — NUR
PERSONAL DAILY GOAL
RELAXATION TECHNIQUES
PT STATES, "I GET SO ANXIOUS, I NEED TO KNOW HOW TO RELAX"

## 2019-01-14 NOTE — NUR
Spoke with Candi at Dr. Gonzalez office. Notified of Pt. admission and unable
to have Endoscopy which is scheduled for tommorow. Pt. can follow up
outpatient at discharge.

## 2019-01-14 NOTE — NUR
RESTING WELL IN SEMI FOWLERS POSITION TO SLOW POST NASAL DRIP CAUSING COUGH
24 HR chart check completed.

## 2019-01-14 NOTE — NUR
DR. ALBERTO ON FLOOR TO ASSESS PATIENT. PATIENT IN THE BATHROOM AND NEEDED
ASSISTANCE. DR. ALBERTO STATED THAT SHE WOULD BE BACK TO ASSESS PATIENT.

## 2019-01-15 VITALS — DIASTOLIC BLOOD PRESSURE: 67 MMHG

## 2019-01-15 VITALS — SYSTOLIC BLOOD PRESSURE: 112 MMHG | DIASTOLIC BLOOD PRESSURE: 62 MMHG

## 2019-01-15 NOTE — NUR
[facility] Male [facility] available tommorow.
Referral faxed to [facility].

## 2019-01-15 NOTE — NUR
P: ISOLATIVE TO SELF
I: ENCOURAGED PT TO INTERACT MORE WITH STAFF AND PEERS, MEDICATION EDUCATION,
PROVIDED 1:1 FOR EMOTIONAL SUPPORT.
R: PT REMAINS QUIET AND WITHDRAWN TO SELF. MEDICATION COMPLIANT WITHOUT
DIFFICULTY.
P: CONTINUE TO ENCOURAGE MEDICATION COMPLIANCE, PROVIDE 1:1 FOR EMOTIONAL
SUPPORT, CONTINUE TO ENCOURAGE PT TO INTERACT MORE WITH STAFF AND PEERS.

## 2019-01-15 NOTE — NUR
PERSONAL DAILY GOAL
STRESS/ANXIETY REDUCTION
PT EXPRESSED ANXIETY OVER NOT BEING ABLE TO TASTE FOOD/DECIDING WHAT TO EAT

## 2019-01-15 NOTE — NUR
Occupational Therapy evaluation completed on 3 with full eval to follow.
Precautions include new ww use, impulsivity at times w/ LOB, min a LB self
care. Patient is low complexity level 72811 via chart review, testing and
evaluation Recommend d/c to [facility] for max safety and well being. Thank you for
this referral.
Marisel Villanueva OTR/L

## 2019-01-15 NOTE — NUR
AM GROUP/EXERCISE/SELF-WORTH
PT ATTENDED AND PARTICIPATED IN ALL GROUP ACTIVITIES. PT EXHIBITED NO SIGNS OF
ANXIETY AND ACTUALLY DOZED OFF A FEW TIMES. PT WILL CONTINUE TO ATTEND AND
PARTICIPATE IN GROUP THERAPY.

## 2019-01-15 NOTE — NUR
PHYSICAL THERAPY
PAtient evaluated on three this date, full  evaluation to follow. Continue
with PT as per plan of care with fall, unit three and alarms prn percautions.
Patient is moderate complexity via chart review, tests and evaluation: 22070.
Thank you for this referral. Jodi Diaz,PT

## 2019-01-15 NOTE — NUR
Treatment Plan meeting with Dr. Parish, RN, AT, SW and Discharge Planner. Plan
for disharge at the end of the week or beginning of next week. Pt. resides at
home but remains very confused. Will discuss placement with patient.

## 2019-01-16 VITALS — DIASTOLIC BLOOD PRESSURE: 60 MMHG

## 2019-01-16 VITALS — SYSTOLIC BLOOD PRESSURE: 125 MMHG | DIASTOLIC BLOOD PRESSURE: 65 MMHG

## 2019-01-16 NOTE — NUR
PRN ATIVAN 1MG PO GIVEN AT THIS TIME FOR INCREASED ANXIETY, PT TEARFUL,
VISIBLY ANXIOUS, WRINGING HANDS AND PRACTICING COPING SKILLS WITH SOCIAL
WORKER TO HELP COPE WITH THE STRESS OF DEALING WITH WIFE'S ILLNESS AS WELL AS
PT'S OWN ANXIETY. PT REMAINS WITH  AT THIS TIME, WILL MONITOR FOR
MEDICATION EFFECTIVENESS.

## 2019-01-16 NOTE — NUR
P- DEPRESSED MOOD, HOPELESS/HELPLESS, INCREASED ANXIETY & RESTLESSNESS, PT
VISIBLY ANXIOUS, WRINGING HANDS, UNABLE TO SIT STILL. PT ASKED THIS NURSE "IS
THERE ANY HOPE FOR ME?" AND ALSO STATED "SOMETIMES YOU JUST HAVE THOUGHTS YOU
DON'T WANT TO HAVE, YA KNOW?" PT GUARDED IN RESPONSES AND DECLINED TO
ELABORATE.
I- ORIENTATION, MOOD AND BEHAVIOR ASSESSED. ASSESSED PT FOR SI/HI, INTENT OR
PLAN. ASSESSED PT FOR S/S INTERNAL STIMULI, PARANOIA/DELUSIONS. MEDICATIONS
ADMINISTERED AS PER PHYSICIAN'S ORDERS. 1:1 WITH PT, ALLOWED PT TO EXPRESS
FEELINGS, EMOTIONAL SUPPORT PROVIDED. ENCOURAGED PT TO ATTEND AND PARTICIPATE
IN GROUPS AND ACTIVITIES.
R- PT IS ALERT AND ORIENTED X4. RESPS EASY AND EVEN ON ROOM AIR. MOOD IS
DEPRESSED, HOPELESS/HELPLESS AND ANXIOUS. AFFECT IS FLAT, GUARDED. SPEECH IS
SOFT, COHERENT, ABLE TO MAKE NEEDS KNOWN WITHOUT DIFFICULTY. PT GUARDED DURING
1:1, PT ASKED THIS NURSE "IS THERE ANY HOPE FOR ME?" EMOTIONAL SUPPORT
PROVIDED. PT BEGAN TALKING ABOUT HIS MCKAYLA AND KNOWING THAT "GOD WILL PROVIDE"
THIS NURSE OFFERED TO CALL THE [facility] GROUP TO COME SEE WITH PT AND PRAY
WITH HIM, PT STATED "NO, NOT RIGHT NOW. MAYBE ANOTHER TIME". THIS NURSE
ENCOURAGED PT THAT IF AT ANYTIME HE WOULD LIKE TO SEE A  TO ASK STAFF,
PT STATES "OKAY, I WILL". PT VISIBLY VERY ANXIOUS BEFORE LUNCH, WRINGING
HANDS, UNABLE TO SIT STILL. PT WAS MEDICATED WITH ROUTINE VISTARIL FOR
ANXIETY, WILL MONITOR FOR EFFECTIVENESS. PT STATED "SOMETIMES YOU JUST HAVE
THOUGHTS YOU DON'T WANT TO HAVE, YA KNOW?" PT CONTINUED TO BE GUARDED IN
RESPONSES AND DECLINED TO ELABORATE ON THIS STATEMENT. THIS NURSE QUESTIONED
PT REGARDING SUICIDAL IDEATIONS, PT STATES "NO, I WON'T DO THAT". PT VERBALLY
CONTRACTED FOR SAFETY. PT IS MEDICATION COMPLIANT WITHOUT DIFFICULTY. PT
ATTENDED MORNING GROUP.
P- PLAN TO CONTINUE CURRENT TREAMENT, MONITOR MOOD AND BEHAVIOR, PROVIDE
REORIENTATION, REDIRECTION AND 1:1 AS NEEDED. CONTINUE TO ENCOURAGE MEDICATION
COMPLIANCE AS WELL AS GROUP ATTENDANCE AND PARTICIPATION.

## 2019-01-16 NOTE — NUR
PATIENT OBSERVED ON Q 15 MIN CHECKS TO HAVE SLEPT APPROX 7 HOURS WITH X1
AWAKENINGS TO USE THE RESTROOM, NO SIGNS OR SYMPTOMS OF DISTRESS NOTED.

## 2019-01-16 NOTE — NUR
P---HOPELESSNESS, ANXIETY, PERIODS OF CONFUSION
I--TALKED WITH CLIENT ABOUT HIS DAY. EDUCATED ON MEDICATIONS AND USES. HELPED
WITH NASAL SPRAY AS HE COULDN'T FOLLOW DIRECTIONS ON USING. CONTINUE TO ORIENT
OFFER EMOTIONAL SUPPORT
R--MY DAY WAS OK. I AM STILL ANXIOUS BUT THE MEDICATION GIVEN EARLIER HELPED.
I HAVE SINUS PROBEMS.
P--CONTINUE TO PROVIDE EMOTIONAL SUPPORT. MONITOR FOR CHANGES IN BEHAVIOR.
CONTINUE Q 15 MIN SAFETY CHECKS. BE AVAILABLE WHEN HE WANTS TO TALK

## 2019-01-16 NOTE — NUR
PM GROUP/AROMATHERAPY/GUIDED MEDITATION
PT ATTENDED AND PARTICIPATED IN ALL GROUP ACTIVITIES. PT WAS ABLE TO RELAX AND
EXHIBITED NO SIGNS OF ANXIETY. PT WILL CONTINUE TO ATTEND AND PARTICIPATE IN
GROUP.

## 2019-01-16 NOTE — NUR
OT NOTE
Pt was seen this A.M. 1:1 for 15 minute OT session. Upon arrival pt was
sitting upright in chair at dining room table, pt identified by name and 
and had no complaints at this time. Educated and demonstrated pt on use of
sock aid and reacher for increased I in self care tasks. Pt doffed socks with
use of reacher with modA due to poor understanding of sequencing. Pt then
donned socks using the sock aid with modA. Pt stated that he was not happy
with adaptive equipment and preferred to do it "his way". Pt was left sitting
upright in chair in dining room under [facility] staff supervision. Continue with rec
D/C plan to [facility].
 
ROBERTO Holden/EDOUARD

## 2019-01-16 NOTE — NUR
OCCUPATIONAL THERAPY CO-SIGN
 
I approve of the Occupational Therapy notes written above.
YAKOV ANSARI OTR/EDOUARD

## 2019-01-16 NOTE — NUR
PHYSICAL THERAPY
PT SITTING AT TABLE IN CHAIR UPON ARRIVAL OF PTA FOR PT TREATMENT. PT
IDENTIFIED BY NAME AND . PT AGREED TO TAKE PART IN THERAPY. PT GAIT TRAINED
40FT X 2 AND 120FT X2 WITH FWW AND SBA. VC'S FOR SAFETY, PROPER POSTURE AND
PROPER USE OF FWW. PT PERFORMED 360* TURNS WITHOUT LOSS OF BALACNE AND USE OF
FWW AND SBA. PT GAIT TRAINED SIDE TO SIDE 10FT EACH SIDE AND BW 10FT WITH
HAND HELD ASSIST ON HAND RAIL AND SBA. PT C/O INCREASE PAIN IN L HIP AT END OF
TREATMENT. PT STATED WITH REST THE PAIN WOULD GO AWAY. PT SITTING IN CHAIR AT
TABLE AT END OF SESSION IN DINNING ROOM. PT SEEN 1:1 18MINS. ASHWIN WERNER PTA

## 2019-01-16 NOTE — NUR
P-ISOLATIVE TO SELF, ANXIOUS
 
I-PROVIDE 1:1 AND EMOTIONAL SUPPORT FOR PATIENT TO VOICE FEELINGS, ENCOURAGE
SOCIALIZATION WITH PEERS AND STAFF. OFFER DIVERSIONAL ACTIVITIES (TV,
SNACK, PUZZLES). ENCOURAGE MEDICATION COMPLIANCE.
 
R- PT PARTICIPATED IN 1:1, STATED TO THIS NURSE "IM DOING OKAY RIGHT NOW,
DOING BETTER". DENIES SI/HI. SAT IN DINING ROOM WATCHING TV WITH PEERS,
REMAINS ISOLATIVE TO SELF UNLESS PROMPTED BY STAFF, ATE HS SNACK.  MEDICATION
COMPLIANT WITHOUT DIFFICULTY AFTER REVIEW, RECIEVED PRN TYLENOL AS REQUESTED
AT 2235 FOR C/O LEFT HIP PAIN WITH A RATING OF 8/10. NO FURTHER COMPLAINTS OF
PAIN NOTED, PRN EFFECTIVE.
 
P-CONTINUE TO ENCOURAGE MEDICATION COMPLIANCE, PROVIDE 1:1 FOR EMOTIONAL
SUPPORT, CONTINUE TO ENCOURAGE PT TO INTERACT MORE WITH STAFF AND PEERS.

## 2019-01-16 NOTE — NUR
Spoke with Patient Pastor Deon Bernstein,
who is also P.O.A.  states that he has
Power of  paperwork at home and is actually in [place] at this time.
He will return home on Tuesday and can provide the paperwork on Wednesday.
Advised of concerns for patient going home at discharge due to confusion and
complex needs requiring 24 hour care. Deon states understanding and states
that referral to Nursing Homes locally to start. [facility], then [facility].

## 2019-01-16 NOTE — NUR
REVIEW CALLED TO JENNIFER [phone] EXT  [phone].  DR WHALEN PHONE NUMBER
GIVEN FOR REVIEW. PER JENNIFER.

## 2019-01-16 NOTE — NUR
AM GROUP/EXERCISE/BRAIN GAMES
PT ATTENDED AND PARTICIPATED IN ALL GROUP ACTIVITIES. PT SEEMS SOMEWHAT SLOW
IN PROCESSING ANSWERS. PT EXHIBITED NO ANXIETY DURING GROUP

## 2019-01-16 NOTE — NUR
Treatment Plan meeting with Dr. Parish, RN, SW and Discharge Planner. Plan for
discharge Next week. Referral has been made to [facility] where pt.
wife is a resident.  is working for [employer].
[facility] Reviewing Referral.

## 2019-01-16 NOTE — NUR
Spoke with Mckayla at [facility]. Pt. insurance is out of network.
Unable to accept referral.

## 2019-01-17 VITALS — SYSTOLIC BLOOD PRESSURE: 125 MMHG | DIASTOLIC BLOOD PRESSURE: 89 MMHG

## 2019-01-17 VITALS — SYSTOLIC BLOOD PRESSURE: 112 MMHG | DIASTOLIC BLOOD PRESSURE: 62 MMHG

## 2019-01-17 NOTE — NUR
PHYSICAL THERAPY
 
Patient seen this am for therapy visit and was sitting at activity room table
upon therapist arrival. Patient was soft spoken and pleasant this morning,
voicing no new c/o's.  [facility] was present for observation only this
session as patient transfers sit to stand with SBA. Patient ambulates with use
of wh walker, SBA, 100'x 1, demonstrating increased gait velocity. Patient
also upon return c/o of L hip weakness / slight pain and performed standing L
hip SLR x 3 way, 10 reps each for strengthening prior to returning to chair at
table in activity room. Pateint remained in chair under [facility] Supervision
and will continue per POC as tolerated, total treatment time 16 minutes.
Isidro Berrios, PTA

## 2019-01-17 NOTE — NUR
[facility] wife Mica who is a patient. Pt.
wife is being transferred out today and Pt. requested to see her. Pt. visited
without incident and was transferred back to the U in stable condition. Pt.
happy he was able to see his wife.

## 2019-01-17 NOTE — NUR
,  AND  ON UNIT TO SEE PT AT THIS TIME,  MADE
AWARE PT PRESENTED WITH NONPRODUCTIVE COUGH LAST NIGHT WHICH PREVENTED RESTFUL
SLEEP.  TO REVIEW MEDICATION ORDERS.

## 2019-01-17 NOTE — NUR
OT NOTE
Attempted to see pt this A.M. for OT session and upon arrival pt was supine
in bed asleep and would not arouse to verbal stimuli. Will check back at a
later time/date.
 
ROBERTO Holden/EDOUARD

## 2019-01-17 NOTE — NUR
Treatment Plan meeting with Dr. Parish, RN, AT, SW and Discharge Planner. Plan
for discharge next week.

## 2019-01-17 NOTE — NUR
P- DEPRESSED MOOD, HOPELESSNESS, ANXIETY, PERIODS OF CONFUSION.
I- ORIENTATION, MOOD AND BEHAVIOR ASSESSED. ASSESSED PT FOR SI/HI, INTENT OR
PLAN. ASSESSED PT FOR HALLUCINATIONS, NO RESPONSE TO INTERNAL STIMULI NOTED.
MEDICATIONS ADMINISTERED AS PER PHYSICIAN'S ORDERS, MEDICATION EDUCATION
PROVIDED REGARDING NEW MEDICATION CHANGES BY  TODAY TO HELPP PT WITH
INCREASED ANXIETY LEVELS. ADL CARE COMPLETED BY PT, STAFF PROVIDED HYGIENE
ITEMS FOR PT AND PT ABLE TO COMPLETE TOLIETING INDEPENDENTLY. USES WALKER FOR
MOBILITY. ENCOURAGED PT TO ATTEND AND PARTICIPATE IN [place] GROUPS AND
ACTIVITIES.
R- PT IS ALERT AND ORIENTED TO PERSON, PLACE, APPROXIMATE TIME AND SITUATION.
MILD CONFUSION NOTED AT TIMES. RESPS EASY AND EVEN ON ROOM AIR. MOOD REMAINS
DEPRESSED, PT APPEARS HOPELESS AT TIMES WITH FLAT, SAD AFFECT. SPEECH IS SOFT,
COHERENT, ABLE TO MAKE NEEDS KNOWN. PT RECEPTIVE TO MEDICATION CHANGES AND
EDUCATION TO HELP PT COPE WITH ANXIETY. PT REPORTS BEING WORRIED ABOUT HIS
WIFE'S HEALTH AS SHE IS ALSO HOSPITALIZED. EMOTIONAL SUPPORT PROVIDED AND PT
ENCOURAGED TO UTILIZE NEW COPING SKILLS HE HAS PRACTICED WITH .
PT DENIES SI/HI, INTENT OR PLAN. PT DENIES HALLUCINATIONS, NO RESPONSE TO
INTERNAL STIMULI NOTED. NO PARANOIA/DELUSIONS NOTED. PT IS MEDICATION
COMPLIANT WITHOUT DIFFICULTY. PT ATTENDED MORNING GROUP.
P- PLAN TO CONTINUE CURRENT TREATMENT, CONTINUE TO MONITOR MOOD AND BEHAVIOR,
PROVIDE REORIENTATION, REDIRECTION AND 1:1 AS NEEDED. CONTINUE TO PROVIDE
EMOTIONAL SUPPORT AND ASSIST PT IN UTILIZATION OF COPING MECHANISMS TO DEAL
WITH ANXIETY. CONTINUE TO ENCOURAGE MEDICATION COMPLIANCE AS WELL AS GROUP
ATTENDANCE AND PARTICIPATION.

## 2019-01-17 NOTE — NUR
P: DEPRESSED MOOD, HOPELESSNESS, MILD CONFUSION.
I: MOOD AND BEHAVIOR ASSESSED, NO SI/HI VERBALIZED. MEDICATIONS ADMINISTERED
AS ORDERED. REMAINS INDEPENDENT WITH CARE AND TOILETING.
R: ALERT. REMAINS DEPRESSED WITH SAD AFFECT. MEDICATION COMPLIANT, ORIENTED TO
SELF AND PLACE. PLEASANT AND INTERACTIVE WITH ENCOURAGEMENT.
P: CONTINUE CURRENT TREATMENT PLAN, EVALUATE MOOD FOR HOPELESSNESS. ENCOURAGE
1:1 INTERACTION AND ENCOURAGE
VERBALIZING FEELINGS OF HOPELESSNESS. EMOTIONAL SUPPORT
PROVIDED AND MED COMPLIANT.

## 2019-01-17 NOTE — NUR
ROUTINE ATIVAN 1MG PO TID ORDERED THIS MORNING BY  NOT SCHEDULED TO
START UNTIL 1300. THIS NURSE DISCUSSED WITH ,  GAVE VERBAL
ORDER TO GIVE ATIVAN 1MG PO X1 NOW. READ BACK AND VERIFIED.

## 2019-01-17 NOTE — NUR
Reviewed coping skills and eating and provided positive reinforcement and also
support. Also, discussed pt going to visit his wife but set limits for
timeframe. Pt expressed appreciation and stated he was overwhelmed. Pt seems
to be making progress with expressing his emotions a little more which is
positive.

## 2019-01-17 NOTE — NUR
AM GROUP/EXERCISE/CURRENT EVENTS
PT ATTENDED AND PARTICIPATED IN SOME OF THE GROUP ACTIVITIES. PT KEPT DOZING
OFF. PT EXHIBITED NO SIGNS OF ANXIETY THIS MORNING.

## 2019-01-17 NOTE — NUR
SLEPT WELL PAST 2200PM.  MOSTLY SAT UPRIGHT IN BED DUE TO 2 COUGHING SPELLS.
WILL PASS ON FOR COUGH SUPPRESSENT. REMAINS HOPELESS/HELPLESS. PERIODS OF
CONFUSIION NOTED. APPEARS SLOW TO PROCESS QUESTIONS .

## 2019-01-17 NOTE — NUR
ROUTINE ATIVAN ORDERED BY  THIS MORNING NOT SCHEDULED TO START TIL
1300. THIS NURSE DISCUSSED WITH ,  GAVE VERBAL ORDER FOR ONE
TIME DOSE TO BE GIVEN THIS AM. READ BACK AND VERIFIED.

## 2019-01-18 VITALS — SYSTOLIC BLOOD PRESSURE: 126 MMHG | DIASTOLIC BLOOD PRESSURE: 71 MMHG

## 2019-01-18 VITALS — DIASTOLIC BLOOD PRESSURE: 69 MMHG

## 2019-01-18 NOTE — NUR
OT NOTE
Attempted to see pt this A.M. for OT session and upon arrival pt was supine in
bed asleep. Did not arouse to verbal stimuli. Will continue with POC as able.
 
ROBERTO Holden/EDOUARD

## 2019-01-18 NOTE — NUR
Spoke with Nurse at [facility] where Pt. wife is a patient and
requested for Pt. wife to have assistance to call patient and provided with
phone number. Pt. wife was able to call and check on pt. status. Pt. spoke
with his wife via cell phone and provided her with his updates.

## 2019-01-18 NOTE — NUR
PM GROUP/RELAXATION
PT ATTENDED AND PARTICIPATED IN ALL GROUP ACTIVITIES. PT EXHIBITED NO SIGNS OF
ANXIETY DURING GROUP BUT DID HAVE QUESTIONS ABOUT HIS WIFE. PT SAT IN A COMFY
CHAIR WITH HIS EYES CLOSED.

## 2019-01-18 NOTE — NUR
REVIEW COMPLETED WITH JENNIFER WILKINSON IS APPROVED THROUGH SUNDAY JAN 20 WITH NEXT
REVIEW DATE JAN 21.

## 2019-01-18 NOTE — NUR
AM GROUP/CREATIVE OUTLETS
PT WAS PRESENT FOR MORNING GROUP THERAPY BUT DID NOT PARTICIPATE. PT STATED,
"I FEEL DROOPY". PT SAT UPRIGHT IN A CHAIR WITH EYES CLOSED.

## 2019-01-18 NOTE — NUR
P: DEPRESSED MOOD, FLAT AFFECT, HOPELESS/HELPLESS, ISOLATIVE TO SELF
I: PROVIDE EMOTIONAL SUPPORT AND 1:1 FOR PT TO VOICE FEELINGS , ENCOURAGE PT
TO PARTICIPATE IN GROUPS/ACTIVITIES, ENCOURAGE SOCIALIZATION WITH STAFF AND
PEERS, ENCOURAGE PT TO PERFORM ADL'S WITH STANDBY ASSISSTANCE FROM STAFF.
R: PT PRESENT FOR GROUPS, DID NOT ACTIVELY PARTICIPATE, VOICED FEELINGS OF
WORRY RE:WIFE'S CONDITION, REQUIRES MUCH STAFF ASSISTANCE WITH ADL'S, UNABLE
TO PUT PULLUP/DEPEND ON OR APPLY CHAPSTICK WITHOUT ASSISTANCE
P: CONTINUE TO PROVIDE EMOTIONAL SUPPORT AND 1:1 FOR PT TO VOICE FEELINGS,
ENCOURAGE PARTICIPATION IN GROUPS/ACTIVITIES, ENCOURAGE PT TO PERFORM ADL'S
WITH STANDBY ASSISTANCE FROM STAFF, MONITOR PT BEHAVIORS ON Q15 MIN SAFETY
CHECKS.
 
PT ALERT TO PERSON, PLACE, TIME AND SITUATION, SHORT TERM MEMORY DEFICITS
NOTED. PT MED COMPLIANT WITHOUT DIFFICULTY, MED EDUCATION PROVIDED. NO
HALLUCIANTIONS OR DELUSIONS NOTED. PT DENIES ANY SUICIDAL THOUGHTS. PT
AMBUALTORY WITH WHEELED WALKER, GAIT STEADY. PT CONTINENT OF BOWEL AND
BLADDER.

## 2019-01-18 NOTE — NUR
Treatment Plan meeting with Dr. Parish, RN, AT, SW and Discharge Planner. Plan
for discharge next week. Referral has been made to [facility]. Will Follow
Monday.

## 2019-01-18 NOTE — NUR
P: WITHDRAWN TO SELF
I: ENCOURAGE MEDICATION COMPLIANCE, INCREASED INTERACTIONS WITH PEERS AND
STAFF, PROVIDED 1:1 TO ALLOW TIME FOR PT TO VENT FEELINGS, ENCOURAGE PT TO
COMPLETE ADLS WITH STANDBY ASSIST.
R: PT REMAINS WITHDRAWN TO SELF. WILL INTERACT WHEN HE IS SPOKEN TO HOWEVER,
WILL NOT INITIATE INTERACTIONS, MEDICATION COMPLIANT WIHTOUT DIFFICULTY.
P: CONTINUE TO ENCOURAGE MEDICATION COMPLAINCE, INCREASED INTERACTIONS WITH
PEERS AND STAFF, ENCOURAGE PT TO COMPLETE AS MANY ADLS BY HIMSELF AS HE CAN,
AND PROVIDE 1:1 TO ALLOW TIME FOR PT TO VENT FEELINGS.
 
BEHAVIORS MONITORED WITH Q15 MINUTE SAFETY CHECKS.

## 2019-01-18 NOTE — NUR
PHYSICAL THERAPY
 
Patient seen this am for therapy visit and was sitting at activity room table
upon therapist arrival. [facility] staff member present during therapy session as
patient voices no new c/o's at this time. Patient transfers sit to stand SBA
and ambulates with use of  walker, 175'x 1, Supervision, needing v/c for
improved upright posture and decreased gait velocity to improve safety.
Patient returned to chair in activity room and remained under [facility] staff
Supervision. Will continue per POC as tolerated, total treatment time 16
minutes. Isidro Berrios, PTA

## 2019-01-18 NOTE — NUR
HENS completed online. PASRR submitted successfully. Validation Complete.
Document referred to [facility] for Further review. Documentation faxed to [facility]
[phone] for Placement.

## 2019-01-19 VITALS — DIASTOLIC BLOOD PRESSURE: 62 MMHG

## 2019-01-19 VITALS — DIASTOLIC BLOOD PRESSURE: 84 MMHG | SYSTOLIC BLOOD PRESSURE: 141 MMHG

## 2019-01-19 NOTE — NUR
P: WITDRAWN TO SELF
I: ENCOURAGED PT TO INTERACT MORE WITH STAFF AND PEERS, ENCOURAGED MEDICATION
COMPLAINCE.
R: PT REMAINS WITHDRAWN TO SELF. MEDICATION COMPLIANT, PT WILL NOT INITIATE
INTERACTIONS, HOWEVER, WILL INTERACT IF STARTED BY STAFF MEMBER. MEDICATION
COMPLIANT
P: CONTINUE TO ENCOURAGE INCREASED INTERACTIONS, AND MEDICATION COMPLIANCE
 
BEHAVIORS MONITORED ON Q15 MINUTE SAFETY CHECKS.

## 2019-01-19 NOTE — NUR
Called Herman's wife at [facility] and she called this writer and pt
back and stated an update and inquired about pts health to him. Pt stated he
felt better having been able to talk to his wife and was sleepy so planned to
go to sleep. The phone number for this writer's cell and the unit were both
provided to pts wife to continue to provide updates and connect with pt.

## 2019-01-19 NOTE — NUR
P: ISOLATIVE TO SELF
 
I: 1:1 INTERACTION WITH EMOTIONAL SUPPORT PROVIDED. ENCOURAGE TO ENGAGE IN
CONVERSATION WITH STAFF AND PEERS. ENCOURAGE TO ATTEND ACTIVITIES WITH PEERS
IN THE DAY ROOM. PROVIDE MEDICATIONS WITH EDUCATION.
 
R: PATIENT READILY ENGAGING IN CONVERSATION WHEN SPOKEN TO. TALKING ABOUT HIS
WIFE AND HOW HE REALLY MISSES HER AND IS GLAD THAT HE GOT TO SEE HER. SEEMS TO
BE IN BRIGHTER SPIRITS TODAY. PATIENT STATED THAT HE IS GOING TO TAKE ONE DAY
AT A TIME. WHEN IN THE DAY ROOM, HE SITS BY HIMSELF IN A CORNER AND DOES NOT
SPEAK UNLESS SOMEONE SPEAKS TO HIM FIRST. FALLS ASLEEP ON AND OFF IN THE DAY
ROOM.
 
P: CONTINUE TO ENGAGE PATIENT IN CONVERSATION WITH STAFF AND PEERS. CONTINUE
TO ENCOURAGE TO PARTICIPATE IN GROUP AND ACTIVITIES WITH PEERS. INCLUDE IN
CONVERSATION. PROVIDE 1:1 WHEN NECESSARY WITH EMOTIONAL SUPPORT. CONTINUE
MEDICATIONS AND THERAPIES PERSCRIBED BY PHYSICIAN. Q15 MINUTE CHECKS
MAINTAINED FOR SAFETY.

## 2019-01-20 VITALS — SYSTOLIC BLOOD PRESSURE: 113 MMHG | DIASTOLIC BLOOD PRESSURE: 55 MMHG

## 2019-01-20 VITALS — SYSTOLIC BLOOD PRESSURE: 117 MMHG | DIASTOLIC BLOOD PRESSURE: 56 MMHG

## 2019-01-20 NOTE — NUR
Provided reassurance about food as there was an issue in dietary with pt order
and pt seemed flexible and was eating his tray even though it was a little
different.
Pt continues to appear with a flat affect.

## 2019-01-20 NOTE — NUR
PM GROUP/EXERCISES/MEDITATION/AROMATHERAPY
PT ATTENDED GROUP BUT DID NOT PARTICIPATE. PT SLEEPING IN CHAIR ON AND OFF.
THIS STAFF SAT WITH PT AND TALKED FOR A FEW MINUTES. PT CONTINUES TO BE
FIXATED ON A BITTER TASTE IN MOUTH AND NOT BEING ABLE TO TASTE FOOD PROPERLY.
PT ASKS "HOW ARE WE GOING TO FIX THIS?" THIS STAFF ENCOURAGES PT TO CONTINUE
TO TALK WITH NURSES ABOUT PHYSICAL CONCERNS. PT STATES "I AM A LITTLE NERVOUS'
BUT DID NOT HAVE A REASON WHY. THIS STAFF REMINDS PT OF DEEP BREATHING AND
RELAXTION SKILLS. PT WILL CONTINUE TO ATTEND GROUP AND PARTICIPATE TO BEST OF
ABILITY.

## 2019-01-20 NOTE — NUR
PERSONAL DAILY GOAL
PT STATES "MY GOAL IS TO FIGURE OUT WHAT IS GOING ON WITH THIS TASTE IN MY
MOUTH" THIS STAFF INQUIRES UPON IF THE TASTE IS THERE ALL THE TIME. PT COULD
NOT GIVE THIS STAFF ANSWERS. THIS STAFF ENCOURAGES PT TO STAY MINDFUL OF BODY
AND WHEN THE BITTER TASTE IS IN HIS MOUTH.STAFF ALSO ENCOURAGES PT TO CONTINUE
TO COMMUNICATE WITH NURSES. PT GOAL IS BODY AWARENESS.

## 2019-01-21 VITALS — SYSTOLIC BLOOD PRESSURE: 121 MMHG | DIASTOLIC BLOOD PRESSURE: 64 MMHG

## 2019-01-21 VITALS — DIASTOLIC BLOOD PRESSURE: 70 MMHG | SYSTOLIC BLOOD PRESSURE: 118 MMHG

## 2019-01-21 NOTE — NUR
PHYSICAL THERAPY
 
Patient seen this am for therapy visit and was sitting in activity room chair
at table upon therapist arrival. Patient reports no new c/o's following
breakfast and transfers al with Supervision. Patient was joined by [facility] staff
alexandre who was present for observation only during entire therapy treatment.
Patiernt ambulates with use of wh walker, 150'x 1, SBA, demonstrating
increased velocity with gait speed and needed v/c to slow josie down a bit,
along with increased upright posture. Patient also demonstrates Poor walker
safety / navigation this session and c/o's increased L hip pain > 100 feet.
Patient returned to activity room chair and remained under [facility] staff
Supervision. Will continue per POC as tolerated, total treatment time 16
minutes. Isidro Berrios, PTA

## 2019-01-21 NOTE — NUR
OT NOTE
Pt was seen this A.M. 1:1 for 24 minute OT session. Upon arrival pt was supine
in bed. Nursing staff present for observation. Pt identified by name and .
Pt transferred supine to sit EOB with Naldo for assist with UB. While sitting
EOB pt donned his socks with SBA and use of personal adaptive techniques due
to stating he does not like the LB AE and prefers to do it his way. Extra time
required for donning socks due to slow rate of performance. Functional
mobility then completed into the bathroom with SBA and use of w/w followed by
functional mobility to the dining room. Throughout all mobility pt required
constant education and verbal prompts for correcting shuffling gait,
correcting his posture, staying closer to the walker, and slowing down due to
being impulsive. Pt was left sitting upright in dining at the table under [facility]
staff supervision. Continue with rec D/C plan to [facility].
 
ROBERTO Holden/EDOUARD

## 2019-01-21 NOTE — NUR
Collaborated with tx team and discharge planner.
PRovided updates on pts behaviors and needs including flat affect and
expression of passive death wish to case management.

## 2019-01-21 NOTE — NUR
JENNIFER FROM [facility] CALLED, REVIEW FOR PT STAY.  STATES SHE WILL HAVE TO
TURN IT OVER FOR A PEER REVIEW.  DR WHALEN NUMBER GIVEN TO HER FOR REVIEW.
WILL CONTINUE TO FOLLOW.

## 2019-01-21 NOTE — NUR
JAMEL returns. Pt. approved for Nursing Facility [facility]. Pt. can enter
Nursing Facility. Referral has been sent to [facility] and Pt. accepted
pending P.O.A. return to [place] to manage Pt. finances to get him into a nursing
facility.

## 2019-01-21 NOTE — NUR
PM GROUP/MOVIE
PT ATTENDED GROUP BUT DID NOT PARTICIPATE DUE TO SLEEPING. PT DID NOT WAK AT
THIS TIME AND WILL BE ENCOURAGED TO ATTEND AND PARTICIPATE IN FUTURE GROUP
SESSIONS.

## 2019-01-21 NOTE — NUR
AM GROUP/EXERCISES/FOCUS/ART
PT ATTENDED AND PARTICIPATED TO BEST OF ABILITY. PT SLEEPING ON AND OFF. PT
TRIED AN ART PROJECT BUT STATES "I'M NOT GOOD AT IT" THIS STAFF ENCOURAGES PT
TO NOT  ARTWORK BUT TO UTILIZE IT TO RELAX. PT REMAINED ON TASK THE REST
OF GROUP. PT DID NOT EXPRESS ANY ANXIETY AT THIS TIME. PT WILL CONTINUE TO
ATTEND AND PARTICIPATE IN GROUP TO BEST OF ABILITY.

## 2019-01-21 NOTE — NUR
Attempted to call pts wife to facilitate a call with pt and pts wife was in a
procedure and unable to talk at this time.

## 2019-01-21 NOTE — NUR
PT CONTINUES TO BE WITHDRAWN TO SELF. DOESN'T ATTEMPT TO INITIATE INTERACTIONS
WITH STAFF OR PEERS. WILL INTERACT IF SPOKEN TO FIRST. DENIES SI/HI.
MEDICATION COMPLIANT WITHOUT DIFFICULTY. STEADY GAIT WITH WHEELED WALKER.
INCONTINENCE CARE PROVIDED. BEHAVIORS MONITORED WITH Q15 MINUTE SAFETY CHECKS.

## 2019-01-22 VITALS — DIASTOLIC BLOOD PRESSURE: 60 MMHG | SYSTOLIC BLOOD PRESSURE: 112 MMHG

## 2019-01-22 VITALS — DIASTOLIC BLOOD PRESSURE: 74 MMHG | SYSTOLIC BLOOD PRESSURE: 138 MMHG

## 2019-01-22 NOTE — NUR
TRACY CORMIER FROM [facility] PT STAY FROM 1/21 HAS BEEN DENIED.  THEY WERE UNABLE
TO REACH DR WHALEN FOR  PEER TO PEER.  EXPEDIATED APPEAL CAN BE COMPLETED  AT
[phone].  PARISA DISCHARGE PLANNER FOR [facility] INFORMED.

## 2019-01-22 NOTE — NUR
AM GROUP/FOCUS ON TASK
PT WAS PRESENT FOR MORNING GROUP THERAPY BUT CHOSE NOT TO PARTICIPATE. PT SAT
UPRIGHT IN A COMFY CHAIR AND DOZED.

## 2019-01-22 NOTE — NUR
PM GROUP/MUSIC AND SOCIALIZATION
PT WAS PRESENT FOR GROUP BUT CHOSE NOT TO PARTICIPATE IN ANY ACTIVITY. PT DID
SIT AND LISTEN TO THE MUSIC. PT BECAME ANXIOUS TO SPEAK WITH HIS WIFE ON THE
PHONE AS SOMEONE HAD PROMISED HIM. I CHECKED FOR HIM AND HE WAS FINE TO WAIT.
PT WAS CALLED OUT TO TALK ON THE PHONE.

## 2019-01-22 NOTE — NUR
OT NOTE
Pt was seen this A.M. 1:1 for 15 minute OT session. Upon arrival pt was
sitting upright in dining room. Pt identified by name and  and had no
complaints at this time. Pt was taken out into the hallway where his session
was completed. While sitting in chair pt donned socks with SBA using personal
adaptive techniques and still not wanting to use adaptive equipment. Pt
completed sit to stand transfer from chair level with modA due to low surface.
Functional mobility completed into the hallway with SBA and use of w/w for UE
support. Pt required verbal prompts throughout entire session for correcting
shuffling gait, staying within a safe distance of the walker, and slowing down
due to being impulsive. Functional mobility completed back into the dining
room where he was left sitting upright at dining room table under [facility] staff
supervision. Continue with rec D/C plan to [facility].
 
ROBERTO Holden/EDOUARD

## 2019-01-22 NOTE — NUR
P-DEPRESSED
 
I-PROVIDE 1:1 FOR EMOTIONAL SUPPORT, ADMINISTER MEDICATIONS, MONITOR SLEEP
 
R-DEPRESSED. LIMITED VERBALLY, ISOLATIVE TO SELF. COMPLIANT WITH MEDICATIONS
 
P-CONTINUE TO MONITOR & PROVIDE WITH PHYSICAL ASSISTANCE & EMOTIONAL SUPPORT
AS NEEDED.

## 2019-01-22 NOTE — NUR
PHYSICAL THERAPY
 
Patient seen this am for therapy visit and was sitting at table in activity
room following breakfast upon therapist arrival.  [facility] staff member present
this morning for observation only during entire therapy visit as patient
voices no new c/o's at this time. Patient performed seated  B LE therex, all
planes, x 20 reps each to increase LE strength, followed by several sit to
stand transfers with Supervision. Patient stated he has already walked with OT
assistant earlier and requested to remain resting in acitivity room chair.
Patient remained under [facility] staff Supervision and will continue per POC as
tolerated.   Isidro Berrois, PTA

## 2019-01-22 NOTE — NUR
PT HAS SLEPT QUIETLY THROUGHOUT THE SHIFT PAST 2215 WITH 1 BRIEF AWAKENING TO
GO TO THE BATHROOM INDEPENDENTLY WITH HIS WALKER.
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
## 2019-01-23 VITALS — SYSTOLIC BLOOD PRESSURE: 142 MMHG | DIASTOLIC BLOOD PRESSURE: 70 MMHG

## 2019-01-23 VITALS — DIASTOLIC BLOOD PRESSURE: 65 MMHG

## 2019-01-23 NOTE — NUR
P: DEPRESSED MOOD AND WITHDRAWN
I: PROVIDE ONE ON ONE, ENCOURAGE PATIENT TO PARTICIPATE IN GROUP SESSION;
 IN TO VISIT TODAY
R: PATIENT ATTENDED GROUP SESSION BUT SLEPT THROUGH HALF OF GROUP SESSION
P: CONTINUE TO PROVIDE EMOTIONAL SUPPORT AND ENCOURAGE GROUP PARTICIPATION
DURING GROUP.

## 2019-01-23 NOTE — NUR
Met with Herman and discussed gun safety and Herman initially stated "it's a
secret" when his gun was located but then was forthcoming with the whereabouts
and indicated it's in a middle  his bedroom upstairs and he keeps it
loaded. Called Kye and let him know. Deon stated once he gets the
keys he will get the gun and store in his home and pt was agreeable to this
plan. Deon stated he will call this writer to confirm the gun was safely
removed. Pt seemed confused with memory gaps and this writer redirected him.
Attempted to call his wife but there was no answer.
Updated the nurses and discharge planner.

## 2019-01-23 NOTE — NUR
Referral to [facility] and [facility] for Meals
on Wheels. Home Health Start of Care Friday.

## 2019-01-23 NOTE — NUR
Called Deon as it was reported he wants to take pt home and inquired about
the POA ppwk. and whether Deon is listed as 1st agent or the wife and Deon
stated he is. However, he will not be able to bring the POA ppwk today as he
is trying to reach the other  who has the key but he will have it
tomorrow. This writer also Discussed gun safety and removing from the home at
least temporarily prior to discharge and Deon agreed that was a good plan
but stated he didn't know Herman to have a gun.

## 2019-01-23 NOTE — NUR
Met with Herman and his -POA and discharge planner and discussed updates
and concerns for safety and mental health. Pt became tearful at times and also
smiled saying he missed seeing his .
Collaborated with discharge planner who indicated  Deon stated he is
unwilling for pt to be placed in a nursing home at this time as pt told him he
wants to go home.

## 2019-01-23 NOTE — NUR
PHYSICAL THERAPY
 
Patient seen this am for therapy visit and was in hallway following OT
Asssistant visit. Patient voices mild L knee pain, but otherwise no new c/o's
at this time. [facility] staff member present during all treatment time for
observation only as patient ambulated with use of wh walker, SBA, 175'x 1,
demonstrating antalgic gait pattern. Patient still needing v/c's for improved
upright posture and to reduce gait velocity to improve safety awareness.
Patient returned to activity room chair at table awaiting breakfast, under [facility]
staff Supervision. Will continue per POC as tolerated, total treatment time 14
minutes. Isidro Berrios, PTA

## 2019-01-23 NOTE — NUR
PATIENT IS ALERT TO PERSON, PLACE AND SITUATIONS, NOT ABLE TO RECALL DATE.
PATIENT HAS SLIGHT CONFUSION AT TIME, WORD FINDING DIFFICULTY NOTED WHILE
CONVERSATING WITH PATIENT. MEMORY GAPS NOTED.  MOOD IS DEPRESSED; FLAT AFFECT.
DENIES ANY HALLUCINATIONS, DELUSIONS, HI/SI OR PAIN. DENIES BEING SAD OR
DEPRESSED.  PATIENT INTERACTIVE WITH STAFF AND PARTICIPATED IN GROUP
SESSION. MEDICATION COMPLAINT WITH EDUCATION PROVIDED. Q 15 MINUTE SAFETY
CHECKS MAINTAINED. 1 PERSON ASSIST WITH ACTIVITIES OF DAILY LIVING, CONTINENT
OF BOWEL AND BLADDER, SET UP FOR MEALS, INTAKES VARIES WITH MUCH
ENCOURAGEMENT. AMBULATES USING WALKER, REMINDERED TO AMBULATE CLOSER WITH THE
WALKER. CONTINUE TO MONITOR MOOD; PROVIDE ONE ON ONE AND REDIRECTION AS
NEEDED.

## 2019-01-23 NOTE — NUR
AM GROUP/CURRENT EVENTS/FOCUS GROUP
PT ATTENDED AND PARTICIPATED TO THE BEST OF HIS ABILITY. PT AFFECT IS FLAT AND
SPEECH IS THOUGHTFUL. PT KEPT DOZING OFF SITTING AT THE TABLE WITH PEERS. PT
EXPRESSED NO ANXIOUS THOUGHTS, ONLY TO ASK IF HE WOULD BE ABLE TO SPEAK WITH
HIS WIFE TODAY?

## 2019-01-23 NOTE — NUR
Treatment Plan meeting with Dr. Parish, RN, AT, SW and Discharge Planner. Plan
for discharge Thursday. Will meet with Deon Bernstein Pt. Power of  to
discuss discharge Plans.

## 2019-01-23 NOTE — NUR
PT REQUESTING TO CALL WIFE, PER [facility] [employer] REEQUEST THIS NURSE ASKED PTS
WIFE WHO THE PRIMARY POA IS, PER THE WIFE SHE IS THE PRIMARY POA. ADVISED THAT
THE SECONDARY POA IS SUPPOSED TO PRESENT PAPERWORK TOMORROW AND [employer] WILL BE IN CONTACT WITH HER.PER  REQUEST THIS NURSE
 ASKED PTS WIFE HER THOUGHTS REGARDING DISCHARGING PT HOME, THE WIFE STATED
"ITS OK IF SHE () THINKS ITS OK"

## 2019-01-23 NOTE — NUR
PERSONAL DAILY GOAL
SELF EXPRESSION/RANGE OF EMOTION
PT AFFECT IS VERY FLAT. PT EXHIBITS NO EMOTIONAL CUES. PT EXPRESSES A NEED FOR
EMOTIONAL OUTLET

## 2019-01-23 NOTE — NUR
OT NOTE
Pt was seen this A.M. 1:1 for 15 minute OT session with PTA Isidro Berrios present
for observation only. Upon arrival pt was supine in bed, pt identified by name
and . Pt transferred supine to sit EOB with Naldo for assist with UB and
educated pt on use of bed rail for increased I in bed mobility. While sitting
EOB pt donned socks with SBA using personal adaptive techniques while
allowing extra time to complete due to slow rate of performance. Sit to stand
completed from bed level with Naldo and education to push from bed surface
versus pulling on walker for increased I. Functional mobility then completed
into the bathroom with CGA and use of w/w for UE support. Pt required constant
verbal prompts to correct shuffling gait, correct his posture, and slow down
due to being impulsive increasing risk of falls. Functional mobility then
completed out to hallway where he was left under PTA supervision. Continue
with rec D/C plan to [facility].
 
ROBERTO Holden/EDOUARD

## 2019-01-23 NOTE — NUR
P-DEPRESSED
 
I-PROVIDE VERBAL INTERVENTION FOR EMOTIONAL SUPPORT, ADMINISTER MEDICATIONS,
MONITOR SLEEP
 
R-MOOD IS DEPRESSED WITH A SLIGHTLY BRIGHTER AFFECT. PT SAT IN THE DINING ROOM
THIS EVENING AT THE TABLE. LIMITED VERBALLY. COMPLIANT WITH HS MEDICATIONS
ABLE TO VOICE NEEDS. AMBULATES INDEPENDENTLY WITH ASSISTANCE OF A WALKER
 
P-CONTINUE TO MONITOR & PROVIDE WITH PHYSICAL ASSISTANCE & EMOTIONAL SUPPORT
AS NEEDED

## 2019-01-24 VITALS — DIASTOLIC BLOOD PRESSURE: 60 MMHG | SYSTOLIC BLOOD PRESSURE: 108 MMHG

## 2019-01-24 VITALS — DIASTOLIC BLOOD PRESSURE: 68 MMHG

## 2019-01-24 NOTE — NUR
AM GROUP/INTERPERSONAL INTERACTIONS
PT CHOSE NOT TO ATTEND MORNING GROUP THERAPY. PT CHOSE TO RETURN TO BED.

## 2019-01-24 NOTE — NUR
Treatment Plan meeting with Dr. Parish, RN, AT, SW and Discharge Planner. Plan
for discharge next week. Dr. Parish would like patient placed for his own
safety and Medication Management. Dr. Parish Feels that Patient could not
safely manage at home without 24 Supervision and Meal prep and Medication
Management.

## 2019-01-24 NOTE — NUR
PERSONAL DAILY GOALS
GOAL SETTING
PT WILL BE DISCHARGED SOON AND STATED, "I DON'T KNOW WHAT TO DO AND WHEN?" PT
WILL LEARN RESONABLE GOAL SETTING

## 2019-01-24 NOTE — NUR
P- PT CONTINUES WITH DEPRESSED MOOD AND ANXIETY AT TIMES RELATED TO WIFE'S
HEALTH CONDITION.
I- ORIENTATION, MOOD AND BEHAVIOR ASSESSED. ASSESSED PT FOR SI/HI, INTENT OR
PLAN. ASSESSED PT FOR S/S INTERNAL STIMULI, PARANOIA/DELUSIONS. MEDICATIONS
ADMINISTERED AS PER PHYSICIAN'S ORDERS. ADL CARE COMPLETED BY PT WITH STAFF
ASSIST X1. ENCOURAGED PT TO ATTEND AND PARTICIPATE IN [facility] GROUPS AND
ACTIVITIES AND TO UTILIZE COPING SKILLS HE HAS LEARNED AND HAS BEEN PRACTICING
WITH STAFF SINCE ADMISSION.
R- PT IS ALERT AND ORIENTED TO PERSON, PLACE, APPROXIMATE TIME AND SITUATION.
MILD CONFUSION/FORGETFULNESS NOTED AT TIMES. RESPS EASY AND EVEN ON ROOM AIR.
MOOD CONTINUES TO APPEAR DEPRESSED, AFFECT IS BROAD RANGE. UPON APPROACH FROM
THIS NURSE THIS AM, THIS NURSE ASKED PT HOW HE WAS FEELING, PT SMILED BRIGHTLY
AND STATED "WITH MY HANDS!" AND THEN BEGAN LAUGHING. PT STATED "YEAH, I'M
FEELING BETTER AND I'M FINALLY GETTING SOME OF MY HUMOR BACK". PT DENIES
SI/HI, INTENT OR PLAN. PT DENIES HALLUCINATIONS, NO RESPONSE TO INTERNAL
STIMULI NOTED. NO PARANOIA/DELUSIONS NOTED. PT IS PLEASANT AND COOPERATIVE,
AMBULATORY WITH WHEELED WALKER. PT IS MEDICATION COMPLIANT WITHOUT DIFFICULTY.
PT HAS ATTENDED GROUPS THIS DATE.
P- PLAN TO CONTINUE CURRENT TREAMTENT, MONITOR MOOD AND BEHAVIOR, PROVIDE
REDIRECTION, 1:1 AND REORIENTATION AS NEEDED. CONTINUE TO ENCOURAGE MEDICATION
COMPLIANCE, GROUP ATTENDANCE AND PARTICIPATION AS WELL AS UTILIZATION OF
APPROPRIATE COPING MECHANISMS FOR DEALING WITH STRESS AND ANXIETY.

## 2019-01-24 NOTE — NUR
Met with Deon and pt and discussed emotion expression being a helpful tool,
coping skills, safety planning, gun safety, and signed out pts keys to Deon
so he could obtain the POA ppwk.
Deon brought the POA ppwk. and upon review it was determined it was only
financial and not healthcare. This was confirmed by  as well and
indicated that the next of kin who is wife would be the one to make decisions
since pt was deemed incompetent by Dr. lincoln at [facility].
Collaborated with tx team regarding Deon's desire to discharge pt today and
 indicated it would be considered AMA and he wants to revoke driving
privleges due to safety concerns. Per request, this writer obtained form from
[employer] [place] to have  complete tomorrow.
Called Deon and provided updates regarding desire for him to keep the car
keys due to safety concerns and  indicating it would be AMA if pt
discharged home rather than to a nursing home facility in which he could be
cared for as he is not able to care for his daily needs at this time. Also,
indicated pt rights and family/POA decision making but confirmed that Deon is
only financial POA not healthcare so it falls to next of kin-wife. Deon
confirmed he removed the gun from the home and has it in his possession. Deon
was offered resources such as [employer] checklist for safety as
this writer discussed concerns including if pt. was confused and left oven on
etc.
Called wife-Mica Toussaint explained that she is next of kin and has the decision
making. This is hard decision making per wife and support was offered as well
as empathetic listening. Wife stated, "I have more things wrong than I knew
and I am keeping some of it from him (pt) because I am trying to protect him."
Difficult because there is not currently a nursing home that can take both pt
and wife together. Went through all of the concerns and the decisions that
need made. Wife stated she needs time as this is a major decision. This writer
empathesized and stated it was confusing as we were told misinformation about
POA and plan but now it's all clarified as we got the paperwork now that
Pastor Chavarria is back in town. Discussed pts need for help with meds, concerns
for daily needs and safety including ovens. Confirmed gun safety and discussed
car safety. Deon and Wife will talk and then call this writer at 3pm
tomorrow with an update as she is still is hospitalized herself and hoping to
discharge. Wife is having a very difficult time making this decision and
stated she needs time because they will lose everything and she knows pt wants
to go home but also worried about his health and safety as she is hearing the
's recommendation.
Called and Asked Deon to call Mica Toussaint-wife mejia at [facility]
[phone] as she couldn't write down his number and doesnt have a way to
get his number. And confirmed he has this writer's cell and can provide to
wife and confirmed plan for wife to talk to Deon ba or tomorrow and then
call this writer by 3pm tomorrow with an update.
Spoke with Herman-blair and explained his wife is doing better with being able to
be discharged to nursing home so won't be able to talk tonight as wife stated
she could not talk to Herman ba.
Nurses and discharge planner updated.

## 2019-01-24 NOTE — NUR
PM GROUP/STRESS REDUCTION
PT ATTENDED GROUP BUT CHOSE NOT TO PARTICIPATE. PT SAT AT A TABLE ALONE AND
DOZED SITTING UPRIGHT IN A CHAIR. PT GOT UP AND WAS ASKED WHERE HE WAS GOING?
PT STATED, "I NEED TO CALL MY WIFE" PT WAS REASSURES THAT HE COULD MAKE A CALL
AFTER GROUP.

## 2019-01-25 VITALS — DIASTOLIC BLOOD PRESSURE: 55 MMHG

## 2019-01-25 VITALS — DIASTOLIC BLOOD PRESSURE: 57 MMHG

## 2019-01-25 NOTE — NUR
Collaborated with tx team and Deon-Financial POA regarding options for
converting Medicare to different provider to ensure pt could be placed with
wife at [facility]. Obtained verbal consent from wife regarding faxing med list
to [facility] as well as discussed pts change in mind and desire to move into the
nursing home with his wife. Pt tearful at times and offered support.

## 2019-01-25 NOTE — NUR
Treatment Plan meeting with Dr. Parish, RN, AT, SW and Discharge Planner. Plan
for discharge Monday.

## 2019-01-25 NOTE — NUR
from [facility] Will Complete [place] PASRR. Will
follow with Oumou LONG on monday for discharge planning. Pt. wife has requested
pt. placed at the same facility that she is located [facility].

## 2019-01-25 NOTE — NUR
P- DEPRESSED MOOD, AFFECT BRIGHTER, PT MAKING JOKES AND INTERACTING
APPROPRIATELY WITH STAFF AND PEERS.
I- REORIENTATION, MOOD AND BEHAVIOR ASSESSED. ASSESSED PT FOR SI/HI, INTENT OR
PLAN. ASSESSED PT FOR S/S INTERNAL STIMULI, PARANOIA/DELUSIONS. MEDICATIONS
ADMINISTERED AS PER PHYSICIAN'S ORDERS. ADL CARE COMPLETED BY, STAFF
SUPERVISION PROVIDED FOR SAFETY. ENCOURAGED PT TO ATTEND AND PARTICIPATE IN
[facility] GROUPS AND ACTIVITIES.
R- PT IS ALERT AND ORIENTED X4. MILD CONFUSION NOTED AT TIMES. RESPS EASY AND
EVEN ON ROOM AIR. MOOD APPEARS SLIGHTLY DEPRESSED, ALTHOUGH AFFECT IS BRIGHTER
THAN PREVIOUS ASSESSMENTS, PT JOKING AND INTERACTING APPROPRIATELY WITH STAFF
AND PEERS. PT REPORTS HE IS FEELING BETTER. PT DENIES SI/HI, INTENT OR PLAN.
PT DENIES HALLUCINATIONS, NO RESPONSE TO INTERNAL STIMULI NOTED. NO
PARANOIA/DELUSIONS NOTED. PT IS MEDICATION COMPLIANT WITHOUT DIFFICULTY. PT
HAS ATTENDED AND PARTICIPATED IN GROUPS AND ACTIVITIES.
P- PLAN TO CONTINUE CURRENT TX, MONITOR MOOD AND BEHAVIOR, PROVIDE
REDIRECTION, 1:1 AND REORIENTATION AS NEEDED. CONTINUE TO ENCOURAGE MEDICATION
COMPLIANCE AS WELL AS GROUP ATTENDANCE AND PARTICIPATION.

## 2019-01-25 NOTE — NUR
AM GROUP/EXERCISE/GAME/ART
PT ATTENDED AND ATTEMPTED TO PARTICIPATE BUT KEPT FALLING ASLEEP
INTERMITTENTLY. PT DID NOT EXPRESS ANY ANXIETY OR HOPELESSNESS AT THIS TIME.
PT WILL CONTINUE TO BE ENCOURAGED TO ATTEND AND PARTICIPATE IN GROUP TO BEST
OF ABILITY.

## 2019-01-25 NOTE — NUR
PHYSICAL THERAPY
 
Patient was seen this am for therapy visit and was sitting in activity room
chair upon therapist arrival. Patient reports no new c/o's other than chronic
L knee pain during prolonged standing activities. OT assistant was present for
observation only during this therapist treatment time as patient transfers sit
to stand SBA. Patient ambulates with use of wh walker, 100'x 1, SBA,
demonstrating slouched, over reaching forward posture which promotes unsteady,
increased velocity during gait ex. Patient received several v/c's to stand
tall while stepping up closer to walker to improve smoother josie. Patient
able to complete 90/180 turns without LOB this session and returned to
activity room chair awaiting breakfast. Patient remained under [facility] staff
Supervision and will continue per POC as tolerated, total treatment time 13
minutes. Isidro Berrios, PTA

## 2019-01-25 NOTE — NUR
Working with Financial Power of , SW and Pt. wife to facilitate
discharge Planning. Attempting to place patient at [facility] with
his wife.

## 2019-01-25 NOTE — NUR
OT NOTE
Pt was seen this A.M. 1:1 for 13 minute OT session. PTA Isidro Berrios and
[facility] staff present for observation. Upon arrival pt was supine in bed, pt
identified by name and  and had no complaints at this time. Pt transferred
supine to sit EOB with Naldo for assist with UB. While sitting EOB pt donned
socks with SBA and extra time given due to slow rate of performance. Sit to
stand completed from bed level with SBA. Functional mobility completed around
his room and into the bathroom with SBA and use of w/w with constant verbal
and tactile prompts to correct his posture, staying within safe walker
distance, and slowing down due to being impulsive. Pt had fair carry over
throughout. Pt was left in the hallway with PTA. Continue with rec D/C plan to
[facility]OBERTO Burgos/EDOUARD

## 2019-01-26 VITALS — SYSTOLIC BLOOD PRESSURE: 136 MMHG | DIASTOLIC BLOOD PRESSURE: 75 MMHG

## 2019-01-26 VITALS — DIASTOLIC BLOOD PRESSURE: 64 MMHG | SYSTOLIC BLOOD PRESSURE: 127 MMHG

## 2019-01-26 NOTE — NUR
1:1 COMPLETED WITH PATIENT THIS AFTERNOON. PATIENT SPOKE ABOUT HIS WIFE AND
HER COMPLEX MEDICAL HISTORY EXTENSIVELY. EMOTIONAL SUPPORT PROVIDED. COPING
SKILLS REVIEWED WITH PATIENT. PT ABLE TO VERBALIZE BACK POSITIVE AND EFFECTIVE
COPING SKILLS FOR TIMES OF INCREASED OR OVERWHELMING ANXIETY AND INTRUSIVE
THOUGHTS.

## 2019-01-26 NOTE — NUR
P-INCREASED DROWSINESS THIS MORNING REPORTED BY PATIENT.
I-ASSESS PT FOR SI/HI, PLAN OR INTENT. ASSESS PT FOR ANY SENSORY DISTURBANCES
AND CHANGES IN MOOD AND BEHAVIOR. ENCOURAGED GROUP THERAPY AND INTERACTIONS
WITH PEERS.
R-PT ALERT AND ORIENTED WITH PERIODS OF CONFUSION WITH ST/LT MEMORY DEFICITS
INTERMITTENTLY. PT PLEASANT AND COOPERAITVE WITH STAFF. REQUESTED TO SLEEP IN
THIS MORNING FOR A COUPLE HOURS. PT UP NOW IN DAY ROOM EATING BREAKFAST.
MEDICATION COMPLIANT WITHOUT DIFFICULTY. RESPS EASY AND EVEN ON ROOM AIR.
P-CONTINUE TO ENCOURAGE GROUP ATTENDENCE AND PARTICIPATION. 1:1 WITH
REORIENTATION AS NEEDED.

## 2019-01-26 NOTE — NUR
AM GROUP/EXERCISE AND BRAIN GAMES
PT ATTENDED AND PARTICIPATED IN ALL GROUP ACTIVITIES. PT WAS SMILING, PLEASANT
AND EVEN JOKING. PT EXHIBITED NO ANXIETY DURING GROUP.

## 2019-01-26 NOTE — NUR
PM GROUP/LEISURE INTERESTS
PT ASKED TO WORK A WORD SEARCH AND WAS PROVIDED WITH A FEW. PT KEPT FALLING
ASLEEP SITTING UPRIGHT IN HIS CHAIR WITH PENCIL IN HAND. PT WAS EASILY
AWAKENED. PT WAS LIGHTHEARTED AND JOKING WITH NO SIGNS OF ANXIETY.

## 2019-01-26 NOTE — NUR
P-CONFUSION AND ANXIETY. PATIENT WITH SHORT TERM AND LONG TERM MEMEORY
DEFICITS. PATIENT WITH NO HALLUCINATIONS OR DELUSIONS. PATIENT WITH NO
SUICIDAL OR HOMICIDAL IDEATIONS. PATIENT WITH EPISODES OF ANXIETY WHEN
DISCUSSING HIS WIFE'S HEALTH AND WANTING TO GO HOME
 
I-REDIRECTION WITH 1:1 INTERVENTIONS AND PRESENT REALITY ORIENTATION. EDUCATE
AND ENCOURAGE MEDICATION COMPLIANCE. DISCUSS COPING SKILLS TO DECREASE
EPISODES OF ANXIETY
 
R-REDIRECTION 1:1 AND PRESENTING REALITY EFFECTIVE FOR SHORT PERIODS OF TIME
DUE TO PATIENT HAVING GAPS IN MEMORY. PATIENT PROVIDED NOURISHMENT,FLUIDS, AND
TOILETING. PATIENT MEDICATION COMPLIANT. PATIENT VERBALIZED FEELING LESS
ANXIOUS ONCE GOING TO BED
 
P-CONTINUE MEDICATION COMPLIANCE, CONTINUE TO PRESENT REALTIY, ENCOURAGE GROUP
THERAPY WHILE AWAKE

## 2019-01-26 NOTE — NUR
PT SLEPT APPROXIMATELY 6 HOURS THIS SHIFT. Q15 MINUTE SAFETY CHECKS
MAINTAINED. PT SCARED ON FIRST HALF OF THE SHIFT. PT KEPT REPEATING "AM I OK,
AM I GOING TO BE OK" PT REASSUREDOF SAFETY.

## 2019-01-27 VITALS — DIASTOLIC BLOOD PRESSURE: 70 MMHG | SYSTOLIC BLOOD PRESSURE: 126 MMHG

## 2019-01-27 VITALS — SYSTOLIC BLOOD PRESSURE: 122 MMHG | DIASTOLIC BLOOD PRESSURE: 69 MMHG

## 2019-01-27 NOTE — NUR
P- ANXIOUS WHEN TALKING ABOUT HIS WIFE AND EXPRESSING HOW MUCH HE MISSES HER.
INTERMITTENT CONFUSION NOTED THROUGHOUT THE DAY; SHORT TERM AND LONG TERM
MEMORY DEFICITS NOTED. DENIES SI OR HI.
 
I- PROVIDE 1:1 INTERACTION WITH EMOTIONAL SUPPORT PROVIDED. PROVIDE RELAXATION
TECHNIQUES WHEN PATIENT START TO REALIZE HE IS GETTING ANXIOUS. OFFER
DIVERSIONAL ACTIVITIES WHEN PATIENT BECOMES ANXIOUS. ALLOW TO EXPRESS
FEELINGS. REORIENT PATIENT WHEN CONFUSION IS NOTED.
 
R- PATIENT READILY TALKED WITH STAFF ABOUT FEELING ANXIOUS ABOUT HIS WIFE.
EXPRESSED CONCERN ABOUT WIFE TO THIS NURSE AND EXPRESSED HOW HE MISSES HER.
PATIENT STATED THAT HE CAN NOT WAIT TO GET TO SEE HER AGAIN. TOLD STORIES TO
THIS NURSE ABOUT HIM AND HIS WIFE. PATIENT REORIENTS EASILY AND REMEMBERS WHEN
REMINDED.
 
P- CONTINUE TO PROVIDE 1:1 AND EMOTIONAL SUPPORT WHEN NECESSARY. ENCOURAGE
PATIENT TO USE RELAXATION TECHNIQUES AND DIVERSIONAL ACTIVITIES WHEN PATIENT
BECOMES ANXIOUS. REORIENT PATIENT WHEN NECESSARY. ASSESS FOR SI/HI EACH SHIFT.
Q15 MINUTE CHECKS MAINTAINED FOR SAFETY. PATIENT WALKS WITH A STEADY GAIT WITH
WALKER. FALLING STAR PROGRAM IN PLACE FOR SAFETY.

## 2019-01-27 NOTE — NUR
PM GROUP/EXERCISES/ART/DAILY POSITIVITY
PT ATTENDED BUT DID NOT PARTICIPATE. PT SLEPT ON AND OFF. WHEN PT WOKE PT
BROUGHT UP THAT WIFE IS DOING BETTER. PT DID NOT EXPRESS ANY ANXIETY OR
DEPRESSION AT THIS TIME. PT WILL CONTINUE TO ATTEND AND BE ENCOURAGED TO
PARTICIPATE TO BEST OF ABILITY.

## 2019-01-28 VITALS — DIASTOLIC BLOOD PRESSURE: 63 MMHG

## 2019-01-28 VITALS — DIASTOLIC BLOOD PRESSURE: 83 MMHG

## 2019-01-28 NOTE — NUR
PT STATED HE CAN DOESNT FEEL WELL AND HAS AN UPSET STOMACH. PRN MAALOX GIVEN
AT THIS TIME. PT STATED HE HAD A BOWEL MOVEMENT TODAY AND IT DID NOT RELIEVE
THE SYMPTOMS. WILL MONITOR PT FOR EFFECTIVNESS OF MEDICATION. None

## 2019-01-28 NOTE — NUR
PHYSICAL THERAPY
 
Patient seen this am for therapy visit and was sitting at table in activity
room upon therapist arrival. Patient presented with "runny nose" reporting not
feeling well this morning due to c/o nausea and upset stomach. OT assistant
was present for observation only during treatment session as patient agreed to
and performed seated B LE therex. all planes, x 20 reps each to increase LE
strength. Patient remained in chair at table in activity room under [facility] staff
Supervision. Will continue per POC as tolerated, total treatment time 13
minutes. Isidro Berrios, PTA

## 2019-01-28 NOTE — NUR
DR. LEGGETT NOTIFIED OF PT HAVING SMALL YELLOW-GREEN THICK LIQUID EMESIS. DR. LEGGETT STATED N.O. ZOFRAN 4MG SL Q6H PRN. ZOFRAN ODT GIVEN AT THIS TIME FOR
NAUSEA/VOMITING. PT AFEBRILE, LCTA. PT SITTING UPRIGHT IN CHAIR AT THIS TIME.

## 2019-01-28 NOTE — NUR
AM GROUP/EXERCISES/GAME
PT ATTENDED GROUP BUT DID NOT PARTICIPATE DUE TO SLEEPING THROUGHOUT GROUP. PT
DID NOT BECOME ANXIOUS AT ANY TIME DURING GROUP. PT WILL CONTINUE TO ATTEND
AND BE ENCOURAGED TO PARTICIPATE TO BEST OF PT ABILITY.

## 2019-01-28 NOTE — NUR
AM GROUP/LEISURE ACTIVITY
PT ATTENDED FIRST 15 MINUTES OF GROUP DURING MEDITATION. PT THEN WENT TO QUIET
ROOM TO TALK WITH VISITOR. PT NEVER RETURNED BUT WILL BE ENCOURAGED TO ATTEND
AND PARTICIPATE TO BEST OF ABILITY IN FUTURE GROUP SESSIONS.

## 2019-01-28 NOTE — NUR
OT NOTE
Pt was seen this A.M. 1:1 for 12 minute OT session. Upon arrival pt was
exiting his room in the hallway, which is where his entire session was
completed. Pt identified by name and  and had reports of nausea and
fatigue. Pt completed functional mobility from bedroom into the dining room
for breakfast with SBA. Pt was initally carrying a basin in his hands,
educated pt on safety issue of not having B hands on the walker and educated
him on compensatory/adaptive stratagies to increase safety and decrease risk
of falls. Also, throughout mobility pt required constant education to slow
down due to being impulsive, staying closer to the walker, and correcting his
posture. Pt was left sitting upright in dining room at breakfast table under
[facility] staff supervision. Continue with rec D/C plan to [facility].
 
ROBERTO Holden/EDOUARD

## 2019-01-29 VITALS — DIASTOLIC BLOOD PRESSURE: 63 MMHG | SYSTOLIC BLOOD PRESSURE: 111 MMHG

## 2019-01-29 VITALS — DIASTOLIC BLOOD PRESSURE: 58 MMHG | SYSTOLIC BLOOD PRESSURE: 105 MMHG

## 2019-01-29 NOTE — NUR
AM GROUP/EXERCISE AND ART
PT ATTENDED AND PARTICIPATED IN ALL GROUP ACTIVITIES. PT EXPRESSED NO ANXIOUS
IDEATIONS DURING GROUP. PT WAS TALKATIVE AND ON TASK

## 2019-01-29 NOTE — NUR
PHYSICAL THERAPY
 
Patient seen this am for therapy visit and was sitting at table in activity
room upon therapist arrival. Patient reports no new c/o's this session and
stated he is feeling a little better today as compared to yesterdays c/o of
nausea / upset stomach. OT assistant was also present during all treatment
time for observation only as patient transfers sit to stand SBA. Patient
ambulates with use of wh walker, SBA, 100' x 1, dmeonstrating a much slower
josie, however still requiring v/c to slow gait velocity to improve safety
awareness.  Patient returned to activity room chair and remained under [facility]
staff Supervision. Will continue per POC, total treatment time 14 minutes.
Isidro Berrios, PTA

## 2019-01-29 NOTE — NUR
Treatment Plan meeting with Dr. Parish, RN, AT and Discharge Planner. Plan for
discharge when bed available at [facility]. [place] PASRALPH and [facility]
Pending.

## 2019-01-29 NOTE — NUR
P: INCREASED ANXIETY OVER INABILITY TO CARE FOR WIFE. DEPRESSION AND
HOPELESSNESS. NO SI/HI..
I: ENCOURAGE 1:1 INTERACTION AND ENCOURAGE VOICING FEELINGS OF HOPELESSNESS.
R: PATIENT IS MED COMPLIANT. EDUCATE ABOUT MEDS. ENCOURAGE INTERACTION WITH
STAFF AND PEERS.
P: CONTINUE TO ENCOURAGE MED COMPLIANCE. CONTINUE WITH COPING SKILLS.

## 2019-01-29 NOTE — NUR
Spoke with Mckayla at [facility]. Pt. triggered Level 2 assessment and Auth is
still pending.

## 2019-01-29 NOTE — NUR
Apoke with Mckayla at [facility]. Pt. [place] PASRR returns pt. is able to enter
nursing facility. Insurance Authorization remains pending. Will follow early
in the a.m. with Mckayla.

## 2019-01-29 NOTE — NUR
P: TEARFUL AND SAD. PATIENT STATES "I JUST DO NOT UNDERSTAND WHY I AM STILL
HERE. I WANT TO BE WITH MY WIFE".
 
I: 1:1 INTERACTION WITH EMOTIONAL SUPPORT PROVIDED. ENCOURAGE TO ATTEND GROUP
FOR SOCIALIZATION AND EMOTIONAL SUPPORT. ENCOURAGE TO ENGAGE IN CONVERSATION
WITH PEERS AND STAFF. PROVIDE MEDICATIONS WITH EDUCATION. PROVIDE ACTIVITIES.
 
R: PATIENT ENGAGED IN CONVERSATION WITH THIS NURSE. PATIENT STATED THAT HE
JUST WANTED TO BE WITH HIS WIFE AND HE FEELS THAT HE HAS BEEN HERE LONG
ENOUGH. PATIENT STATED "I JUST NEED SOME CHEERING UP TODAY". PARTICIPATING IN
GROUP, ISOLATIVE TO SELF UNTIL SOMEONE TALKS TO PATIENT FIRST. WHEN SPOKEN TO
FIRST PATIENT ENGAGES IN CONVERSATION. MEDICATION COMPLIANT WITH NO
DIFFICULTIES. PATIENT CHEERING UP SOME AND JOKING WITH STAFF AND SMILING.
 
P: PROVIDE 1:1 INTERACTION AND EMOTIONAL SUPPORT WHEN NECESSARY. CONTINUE TO
ENCOURAGE PATIENT TO PARTICIPATE IN GROUP FOR SOCIALIZATION AND EMOTIONAL
SUPPORT. PROVIDE MEDICATIONS ON TIME WITH EDUCATION. PROVIDE ACTIVITIES. Q15
MINUTE CHECKS MAINTAINED FOR SAFETY.

## 2019-01-29 NOTE — NUR
OT NOTE
Pt was seen this A.M. 1:1 for 15 minute OT session. Upon arrival pt was
sitting upright on the commode with nursing staff John present for
observbation only. Pt identified by name and  and had no complaints at this
time. Pt donned pants and socks while sitting on the commode with SBA for
safety and extra time given due to slow rate of performance. Functional
mobility completed out to the hallway with SBA and use of w/w for UE support.
Constant verbal prompts provided for correcting posture, staying within
walker, and slowing down due to being impulsive. Pt was left sitting upright
at dining room table under [facility] staff supervision. Continue with rec D/C plan
to [facility].
 
ROBERTO Holden/EDOUARD

## 2019-01-29 NOTE — NUR
PM GROUP/STRESS REDUCTION
PT ATTENDED GROUP BUT CHOSE TO JUST SIT AND LISTEN TO MUSIC. PT EXHIBITED NO
SIGNS OF ANXIETY DURING GROUP. PT WAS QUIET AND AWAKE.

## 2019-01-29 NOTE — NUR
Patient alert an oriented. Patient denies any SI/HI at this time. No
hallucinations/delusions noted at this time. Patient compliant with
medications without difficulty. Provided 1:1 for emotional support.
Q 15 minute safety checks continued and maintained.

## 2019-01-30 VITALS — DIASTOLIC BLOOD PRESSURE: 67 MMHG | SYSTOLIC BLOOD PRESSURE: 129 MMHG

## 2019-01-30 VITALS — DIASTOLIC BLOOD PRESSURE: 67 MMHG

## 2019-01-30 NOTE — NUR
PT DISCHARGED AT THIS TIME. LEFT VIA STRETCHER WITH 2 AMBULANCE STAFF.
DESTINATION [facility]. PT PLEASANT. ALERT & ORIENTED TO PERSON,
PLACE & TIME.

## 2019-01-30 NOTE — NUR
PHYSICAL THERAPY
 
Patient seen this am for therapy visit and was just completing patient care
upon therapist arrival. Patient reports feeling a bit better this morning with
decreased c/o of Nausea / upset stomach. OT assistant was present for all
treatment time for observation only as patient transfers sit to stand, Min A
from low bed surface and ambulates with use of wh walker, 100'x 1, SBA,
demonstating improved smoother josie with slower gait velocity. Patient
returned to activity room chair awaiting breakfast, under [facility] staff
Supervision. Will continue per POC as tolerated, total treatment time 13
minutes. Isidro Berrios, PTA

## 2019-01-30 NOTE — NUR
Called Deon-Financial PIERRE multiple times and no answer so left VM. Also,
attempted an alternative number to the Yarsani and no answer. Discussed
barriers in reaching Deon with Herman who indicated Deon is likely doing
Yarsani tonight and expressed himself and this writer offered support.
Called Joni at [facility] and indicated barriers in reaching Deon and
attempts as well as wife being next of kin to make decisions and the need for
d/c as pt is starting to regress with being away from his wife on restricted
unit. Discharge planner notified this writer of update from [facility] that pt
can come tonight so this writer notified [facility] and will plan for pt to be
discharged tonight.

## 2019-01-30 NOTE — NUR
Waiting to hear from POA. Mckayla has called and Left several messages and has
not received reply. Administration is saying that Patient is ok to discharge
but wife has to agree to look into applying for medicaid immediately.

## 2019-01-30 NOTE — NUR
Message received from Mckayla at [facility]. Insurance denied SNF stay.
Insurance sending to Medical Director but feel that it will be denied because
they see no reason her needs skilled. There is still a chance that it will go
through but chance is slim. Mckayla is working with  for Back up
plan.

## 2019-01-30 NOTE — NUR
Transportation arranged with [facility] to transport with Pick
up time 6:00-6:30. Mckayla from [place] Notified.

## 2019-01-30 NOTE — NUR
Fani from [facility] faxed updates to Insurance and Pt. has been denied. Lacks
support for need for skilled care. Waiting to hear back from  to
see where to go from there.

## 2019-01-30 NOTE — NUR
OT NOTE
Pt was seen this A.M. 1:1 for 15 minute OT session with PTA Isidro Berrios present
for observation only. Upon arrival pt was supine in bed, pt identified by name
and  and had no complaints at this time. Pt transferred supine to sit EOB
with SBA and good use of bed rails. While sitting EOB pt doffed and donned B
socks with SBA using personal adaptive technique of bring leg up to bed level
and resting it there. Extra given to complete task due to slow rate of
performance. Pt then completed functional mobility around his room with SBA
and use of w/w for UE support. Pt required min verbal prompts to maintain
upright posture, staying within a safe walker distance, and maintaining a safe
gait speed. Pt was left in the hallway under PTA supervision. Continue with
rec D/C plan to [facility] Burgos/EDOUARD

## 2019-01-30 NOTE — NUR
Mckayla from [facility] is working with  Oumou at Facility trying to
figure out next step if Pt. is denied SNF.

## 2019-01-30 NOTE — NUR
AM GROUP/EXERCISE AND BALL TOSS
PT ATTENDED AND PARTICIPATED IN ALL GROUP ACTIVITIES. PT EXPRESSED NO ANXIOUS
IDEATIONS DURING GROUP AND IS HAPPY TO BE DISCHARGED TODAY.

## 2019-01-30 NOTE — NUR
Collaborated with discharge planner who confirmed caryl medical review
request signed by  to [facility] as this writer was off sick. Plan
for pt to discharge to nursing home with wife per team discussion. However,
later collaboration with discharge planner indicated there is a barrier for
placement.

## 2019-01-31 NOTE — NUR
PHYSICAL THERAPY CO-SIGN
 
 
I approve of the Phyical Therapy notes written above.
 
                                KORY ESCOTO PT

## 2019-11-17 NOTE — NUR
PERSONAL DAILY GOAL
RELAXATION TECHNIQUES
PT STATED, "I JUST FEEL SO JUMPY THIS MORNING" PT WILL BENEFIT IN LEARNING
RELAXATION TECHNIQUES L foot pain

## 2020-05-15 ENCOUNTER — HOSPITAL ENCOUNTER (INPATIENT)
Dept: HOSPITAL 83 - ED | Age: 80
LOS: 1 days | Discharge: HOME | DRG: 312 | End: 2020-05-16
Attending: EMERGENCY MEDICINE | Admitting: EMERGENCY MEDICINE
Payer: COMMERCIAL

## 2020-05-15 VITALS — DIASTOLIC BLOOD PRESSURE: 78 MMHG

## 2020-05-15 VITALS — HEIGHT: 65.98 IN | BODY MASS INDEX: 36.68 KG/M2 | WEIGHT: 228.25 LBS

## 2020-05-15 VITALS — DIASTOLIC BLOOD PRESSURE: 85 MMHG | SYSTOLIC BLOOD PRESSURE: 165 MMHG

## 2020-05-15 DIAGNOSIS — R39.12: ICD-10-CM

## 2020-05-15 DIAGNOSIS — F32.9: ICD-10-CM

## 2020-05-15 DIAGNOSIS — Z98.41: ICD-10-CM

## 2020-05-15 DIAGNOSIS — Z98.42: ICD-10-CM

## 2020-05-15 DIAGNOSIS — R73.9: ICD-10-CM

## 2020-05-15 DIAGNOSIS — R34: ICD-10-CM

## 2020-05-15 DIAGNOSIS — E83.41: ICD-10-CM

## 2020-05-15 DIAGNOSIS — R55: Primary | ICD-10-CM

## 2020-05-15 DIAGNOSIS — I10: ICD-10-CM

## 2020-05-15 DIAGNOSIS — N32.3: ICD-10-CM

## 2020-05-15 DIAGNOSIS — N40.1: ICD-10-CM

## 2020-05-15 LAB
ALBUMIN SERPL-MCNC: 3.1 GM/DL (ref 3.1–4.5)
ALP SERPL-CCNC: 103 U/L (ref 45–117)
ALT SERPL W P-5'-P-CCNC: 23 U/L (ref 12–78)
APPEARANCE UR: CLEAR
APTT PPP: 23.4 SECONDS (ref 20–32.1)
AST SERPL-CCNC: 22 IU/L (ref 3–35)
BACTERIA #/AREA URNS HPF: (no result) /[HPF]
BASOPHILS # BLD AUTO: 0 10*3/UL (ref 0–0.1)
BASOPHILS NFR BLD AUTO: 0.3 % (ref 0–1)
BILIRUB UR QL STRIP: NEGATIVE
BUN SERPL-MCNC: 21 MG/DL (ref 7–24)
CHLORIDE SERPL-SCNC: 104 MMOL/L (ref 98–107)
COLOR UR: YELLOW
CREAT SERPL-MCNC: 1.02 MG/DL (ref 0.7–1.3)
EOSINOPHIL # BLD AUTO: 0 10*3/UL (ref 0–0.4)
EOSINOPHIL # BLD AUTO: 0.1 % (ref 1–4)
ERYTHROCYTE [DISTWIDTH] IN BLOOD BY AUTOMATED COUNT: 14.9 % (ref 0–14.5)
GLUCOSE UR QL: NEGATIVE
HCT VFR BLD AUTO: 37.5 % (ref 42–52)
HGB UR QL STRIP: NEGATIVE
INR BLD: 1 (ref 2–3.5)
KETONES UR QL STRIP: NEGATIVE
LEUKOCYTE ESTERASE UR QL STRIP: NEGATIVE
LIPASE SERPL-CCNC: 80 U/L (ref 73–393)
LYMPHOCYTES # BLD AUTO: 1 10*3/UL (ref 1.3–4.4)
LYMPHOCYTES NFR BLD AUTO: 12.7 % (ref 27–41)
MCH RBC QN AUTO: 33.9 PG (ref 27–31)
MCHC RBC AUTO-ENTMCNC: 32.3 G/DL (ref 33–37)
MCV RBC AUTO: 105 FL (ref 80–94)
MONOCYTES # BLD AUTO: 0.4 10*3/UL (ref 0.1–1)
MONOCYTES NFR BLD MANUAL: 4.6 % (ref 3–9)
NEUT #: 6.4 10*3/UL (ref 2.3–7.9)
NEUT %: 81.9 % (ref 47–73)
NITRITE UR QL STRIP: NEGATIVE
NRBC BLD QL AUTO: 0 10*3/UL (ref 0–0)
PH UR STRIP: 7.5 [PH] (ref 5–9)
PLATELET # BLD AUTO: 229 10*3/UL (ref 130–400)
PMV BLD AUTO: 9.9 FL (ref 9.6–12.3)
POTASSIUM SERPL-SCNC: 4.3 MMOL/L (ref 3.5–5.1)
PROT SERPL-MCNC: 7.8 GM/DL (ref 6.4–8.2)
RBC # BLD AUTO: 3.57 10*6/UL (ref 4.5–5.9)
SODIUM SERPL-SCNC: 141 MMOL/L (ref 136–145)
SP GR UR: 1.01 (ref 1–1.03)
TROPONIN I SERPL-MCNC: < 0.015 NG/ML (ref ?–0.04)
UROBILINOGEN UR STRIP-MCNC: 0.2 E.U./DL (ref 0.2–1)
WBC #/AREA URNS HPF: (no result) WBC/HPF (ref 0–5)
WBC NRBC COR # BLD AUTO: 7.9 10*3/UL (ref 4.8–10.8)

## 2020-05-15 NOTE — NUR
PATIENT SLEEPING, EYES CLOSED. NO OVERT DISTRESS NOTED. RESP ARE EASY AND
REGULAR ON ROOM AIR. BED IS LOCKED IN LOWEST POSITION, BED ALARM MAINTAINED.
CALL LIGHT WITHIN REACH

## 2020-05-15 NOTE — NUR
A 79, admitted to , under the
services of THEA Hinkle DO with a diagnosis of NEAR SYNCOPE.
Chief complaint is UTI SYMPTOMS.
Patient arrived via STRETCHER from ER.
Monitor applied. Initial assessment completed.
Vital signs taken and recorded.
THEA HINKLE DO notified of admission to the unit.
Orders received.
See assessment for past medical history, medications
and allergies.
Patient and/or family oriented to unit. [facility] 5TH FLOOR
visitation policy reviewed.
Clothing/patient valuable form completed.
 
QIANA MARY

## 2020-05-15 NOTE — NUR
PATIENT SLEEPING, NO SIGNS OF DISTRESS. RESPIRATIONS EASY,NON LABORED. BED IN
LOWEST POSITION,CALL LIGHT WITHIN REACH. WILL CONTINUE TO MONITOR.

## 2020-05-16 VITALS — DIASTOLIC BLOOD PRESSURE: 80 MMHG | SYSTOLIC BLOOD PRESSURE: 157 MMHG

## 2020-05-16 VITALS — DIASTOLIC BLOOD PRESSURE: 77 MMHG | SYSTOLIC BLOOD PRESSURE: 133 MMHG

## 2020-05-16 VITALS — DIASTOLIC BLOOD PRESSURE: 65 MMHG

## 2020-05-16 LAB
25(OH)D3 SERPL-MCNC: 48.9 NG/ML (ref 30–100)
ALBUMIN SERPL-MCNC: 2.7 GM/DL (ref 3.1–4.5)
ALP SERPL-CCNC: 83 U/L (ref 45–117)
ALT SERPL W P-5'-P-CCNC: 20 U/L (ref 12–78)
AST SERPL-CCNC: 16 IU/L (ref 3–35)
BASOPHILS # BLD AUTO: 0 10*3/UL (ref 0–0.1)
BASOPHILS NFR BLD AUTO: 0.3 % (ref 0–1)
BUN SERPL-MCNC: 19 MG/DL (ref 7–24)
CHLORIDE SERPL-SCNC: 106 MMOL/L (ref 98–107)
CHOLEST SERPL-MCNC: 149 MG/DL (ref ?–200)
CREAT SERPL-MCNC: 0.85 MG/DL (ref 0.7–1.3)
EOSINOPHIL # BLD AUTO: 0.1 10*3/UL (ref 0–0.4)
EOSINOPHIL # BLD AUTO: 2.1 % (ref 1–4)
ERYTHROCYTE [DISTWIDTH] IN BLOOD BY AUTOMATED COUNT: 14.7 % (ref 0–14.5)
HCT VFR BLD AUTO: 34.7 % (ref 42–52)
HDLC SERPL-MCNC: 35 MG/DL (ref 40–60)
LDLC SERPL DIRECT ASSAY-MCNC: 90 MG/DL (ref 9–159)
LYMPHOCYTES # BLD AUTO: 1.3 10*3/UL (ref 1.3–4.4)
LYMPHOCYTES NFR BLD AUTO: 19.8 % (ref 27–41)
MCH RBC QN AUTO: 33.5 PG (ref 27–31)
MCHC RBC AUTO-ENTMCNC: 32 G/DL (ref 33–37)
MCV RBC AUTO: 104.8 FL (ref 80–94)
MONOCYTES # BLD AUTO: 0.4 10*3/UL (ref 0.1–1)
MONOCYTES NFR BLD MANUAL: 6.3 % (ref 3–9)
NEUT #: 4.5 10*3/UL (ref 2.3–7.9)
NEUT %: 71.2 % (ref 47–73)
NRBC BLD QL AUTO: 0 % (ref 0–0)
PLATELET # BLD AUTO: 221 10*3/UL (ref 130–400)
PMV BLD AUTO: 10 FL (ref 9.6–12.3)
POTASSIUM SERPL-SCNC: 4 MMOL/L (ref 3.5–5.1)
PROT SERPL-MCNC: 7.1 GM/DL (ref 6.4–8.2)
RBC # BLD AUTO: 3.31 10*6/UL (ref 4.5–5.9)
SODIUM SERPL-SCNC: 143 MMOL/L (ref 136–145)
T4 FREE SERPL-MCNC: 0.92 NG/DL (ref 0.76–1.46)
TRIGL SERPL-MCNC: 119 MG/DL (ref ?–150)
TSH SERPL DL<=0.005 MIU/L-ACNC: 0.76 UIU/ML (ref 0.36–4.75)
VITAMIN B12: 303 PG/ML (ref 247–911)
VLDLC SERPL CALC-MCNC: 24 MG/DL (ref 6–40)
WBC NRBC COR # BLD AUTO: 6.3 10*3/UL (ref 4.8–10.8)

## 2020-05-16 NOTE — NUR
INFORMED OF ORTHOSTATIC BP
LAYING; HR=74 VI=302/58
SITTING; HR=69 AM=804/71
STANDING; HR=74 XP=206/57
PATIENT STATED NO COMPLAINTS OF DIZZINESS OR LIGHTHEADEDNESS.

## 2020-05-16 NOTE — NUR
CCDIS
Discharge instructions reviewed with patient/family. Patient receptive and
verbalizes understanding. Follow-up care arranged. Written instructions given
to patient/family.
LEIGH ELY

## 2020-05-16 NOTE — NUR
in to talk to patient.
Patient states lives at home with his wife.
There are 12 steps in the home.
Physician: Dr. Law Wesley
Pharmacy: [facility]
Home health services: none
Patient's level of ADLs: MINIMAL ASSIST
Patient has working utilities: yes
DME: walker
Follow-up physician's appointment after d/c: will be made by the hospitalist
nurse director upon discharge
Does patient want to access PORTAL?: no
Discharge plan discussed with patient. He lives at home with his wife. There
are stairs in the home but they sleep downstairs and don't use the upstairs.
He is independent in his ADLs and occasionally ambulates with a walker.
Discussed home health care services and he denies any home needs at this time.
When medically stable he will be discharged to home. He states his preacher,
Ze[employer], will provide transportation on discharge.
 
MARCOS NIX

## 2020-05-16 NOTE — NUR
NOTED PATIENT WAS ORDERED NS AT 80 CC/HR. CALLED  TO INQUIRE ABOUT
WHY IT IS TO BE GIVEN D/T PATIENT HAVING BLE EDEMA. STATED SHE WOULD LOOK
INTO THE ORDER.

## 2020-05-16 NOTE — NUR
0800 AM ASSESSMENT COMPLETE. PT RESTING IN BED WITH NO VOICED COMPLAINTS. BED
IN LOW LOCKED POSITION, CALL LIGHT WITHIN REACH. WILL CONTINUE TO MONITOR.

## 2020-05-18 NOTE — NUR
Nursing screen received and patient discharged before screen completed. Thank
you.
Marisel Villanueva OTR/L

## 2020-07-05 ENCOUNTER — HOSPITAL ENCOUNTER (OUTPATIENT)
Dept: HOSPITAL 83 - ED | Age: 80
Setting detail: OBSERVATION
LOS: 2 days | Discharge: HOME | End: 2020-07-07
Attending: STUDENT IN AN ORGANIZED HEALTH CARE EDUCATION/TRAINING PROGRAM | Admitting: STUDENT IN AN ORGANIZED HEALTH CARE EDUCATION/TRAINING PROGRAM
Payer: COMMERCIAL

## 2020-07-05 VITALS — SYSTOLIC BLOOD PRESSURE: 136 MMHG | DIASTOLIC BLOOD PRESSURE: 68 MMHG

## 2020-07-05 VITALS
WEIGHT: 231.19 LBS | SYSTOLIC BLOOD PRESSURE: 130 MMHG | HEIGHT: 67 IN | BODY MASS INDEX: 36.29 KG/M2 | DIASTOLIC BLOOD PRESSURE: 71 MMHG

## 2020-07-05 VITALS — DIASTOLIC BLOOD PRESSURE: 69 MMHG | SYSTOLIC BLOOD PRESSURE: 153 MMHG

## 2020-07-05 DIAGNOSIS — D53.9: ICD-10-CM
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
## 2020-07-05 DIAGNOSIS — E55.9: ICD-10-CM

## 2020-07-05 DIAGNOSIS — N40.0: ICD-10-CM

## 2020-07-05 DIAGNOSIS — E44.0: ICD-10-CM

## 2020-07-05 DIAGNOSIS — R55: Primary | ICD-10-CM
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
## 2020-07-05 DIAGNOSIS — I10: ICD-10-CM

## 2020-07-05 DIAGNOSIS — F32.9: ICD-10-CM

## 2020-07-05 LAB
ALBUMIN SERPL-MCNC: 2.8 GM/DL (ref 3.1–4.5)
ALP SERPL-CCNC: 105 U/L (ref 45–117)
ALT SERPL W P-5'-P-CCNC: 20 U/L (ref 12–78)
APTT PPP: 21.9 SECONDS (ref 20–32.1)
AST SERPL-CCNC: 17 IU/L (ref 3–35)
BASOPHILS # BLD AUTO: 0 10*3/UL (ref 0–0.1)
BASOPHILS NFR BLD AUTO: 0.4 % (ref 0–1)
BUN SERPL-MCNC: 24 MG/DL (ref 7–24)
CHLORIDE SERPL-SCNC: 109 MMOL/L (ref 98–107)
CREAT SERPL-MCNC: 1.15 MG/DL (ref 0.7–1.3)
EOSINOPHIL # BLD AUTO: 0.1 10*3/UL (ref 0–0.4)
EOSINOPHIL # BLD AUTO: 0.7 % (ref 1–4)
ERYTHROCYTE [DISTWIDTH] IN BLOOD BY AUTOMATED COUNT: 15.3 % (ref 0–14.5)
HCT VFR BLD AUTO: 35.1 % (ref 42–52)
INR BLD: 1 (ref 2–3.5)
LYMPHOCYTES # BLD AUTO: 1.3 10*3/UL (ref 1.3–4.4)
LYMPHOCYTES NFR BLD AUTO: 19 % (ref 27–41)
MCH RBC QN AUTO: 33.8 PG (ref 27–31)
MCHC RBC AUTO-ENTMCNC: 32.2 G/DL (ref 33–37)
MCV RBC AUTO: 105.1 FL (ref 80–94)
MONOCYTES # BLD AUTO: 0.4 10*3/UL (ref 0.1–1)
MONOCYTES NFR BLD MANUAL: 5.3 % (ref 3–9)
NEUT #: 5.1 10*3/UL (ref 2.3–7.9)
NEUT %: 74 % (ref 47–73)
NRBC BLD QL AUTO: 0 10*3/UL (ref 0–0)
PLATELET # BLD AUTO: 235 10*3/UL (ref 130–400)
PMV BLD AUTO: 9.5 FL (ref 9.6–12.3)
POTASSIUM SERPL-SCNC: 3.7 MMOL/L (ref 3.5–5.1)
PROT SERPL-MCNC: 7.4 GM/DL (ref 6.4–8.2)
RBC # BLD AUTO: 3.34 10*6/UL (ref 4.5–5.9)
SODIUM SERPL-SCNC: 142 MMOL/L (ref 136–145)
TROPONIN I SERPL-MCNC: < 0.015 NG/ML (ref ?–0.04)
WBC NRBC COR # BLD AUTO: 6.8 10*3/UL (ref 4.8–10.8)

## 2020-07-05 NOTE — NUR
In to see pt at this time.Pt is nsr on the moniter.Lungs are clear at this time
and 22 gague intact on left forearm at this time.Pt states he is doing ok at
this time.

## 2020-07-05 NOTE — NUR
A 79, admitted to , under the
services of EMILIANO Agosto DO with a diagnosis of SYNCOPE AND COLLAPSE.
Chief complaint is SYNCOPE.
Patient arrived via bed from ER.
Monitor applied. Initial assessment completed.
Vital signs taken and recorded.
EMILIANO AGOSTO DO notified of admission to the unit.
Orders received.
See assessment for past medical history, medications
and allergies.
Patient and/or family oriented to unit. [address]
visitation policy reviewed.
Clothing/patient valuable form completed.
 
CYNTHIA BAILEY

## 2020-07-06 VITALS — DIASTOLIC BLOOD PRESSURE: 68 MMHG

## 2020-07-06 VITALS — DIASTOLIC BLOOD PRESSURE: 80 MMHG | SYSTOLIC BLOOD PRESSURE: 164 MMHG

## 2020-07-06 VITALS — DIASTOLIC BLOOD PRESSURE: 82 MMHG

## 2020-07-06 VITALS — DIASTOLIC BLOOD PRESSURE: 63 MMHG

## 2020-07-06 VITALS — DIASTOLIC BLOOD PRESSURE: 72 MMHG

## 2020-07-06 LAB
25(OH)D3 SERPL-MCNC: 58.1 NG/ML (ref 30–100)
ALBUMIN SERPL-MCNC: 2.8 GM/DL (ref 3.1–4.5)
ALP SERPL-CCNC: 97 U/L (ref 45–117)
ALT SERPL W P-5'-P-CCNC: 18 U/L (ref 12–78)
APPEARANCE UR: CLEAR
APTT PPP: 25.6 SECONDS (ref 20–32.1)
AST SERPL-CCNC: 15 IU/L (ref 3–35)
BACTERIA #/AREA URNS HPF: (no result) /[HPF]
BASOPHILS # BLD AUTO: 0 10*3/UL (ref 0–0.1)
BASOPHILS NFR BLD AUTO: 0.5 % (ref 0–1)
BILIRUB UR QL STRIP: NEGATIVE
BUN SERPL-MCNC: 21 MG/DL (ref 7–24)
CHLORIDE SERPL-SCNC: 107 MMOL/L (ref 98–107)
CHOLEST SERPL-MCNC: 145 MG/DL (ref ?–200)
COLOR UR: YELLOW
CREAT SERPL-MCNC: 0.93 MG/DL (ref 0.7–1.3)
EOSINOPHIL # BLD AUTO: 0.1 10*3/UL (ref 0–0.4)
EOSINOPHIL # BLD AUTO: 1.7 % (ref 1–4)
EPI CELLS #/AREA URNS HPF: (no result) /[HPF]
ERYTHROCYTE [DISTWIDTH] IN BLOOD BY AUTOMATED COUNT: 15.5 % (ref 0–14.5)
GLUCOSE UR QL: NEGATIVE
HCT VFR BLD AUTO: 36 % (ref 42–52)
HDLC SERPL-MCNC: 36 MG/DL (ref 40–60)
HGB UR QL STRIP: NEGATIVE
INR BLD: 0.9 (ref 2–3.5)
KETONES UR QL STRIP: NEGATIVE
LDLC SERPL DIRECT ASSAY-MCNC: 94 MG/DL (ref 9–159)
LEUKOCYTE ESTERASE UR QL STRIP: NEGATIVE
LYMPHOCYTES # BLD AUTO: 1.3 10*3/UL (ref 1.3–4.4)
LYMPHOCYTES NFR BLD AUTO: 19.6 % (ref 27–41)
MCH RBC QN AUTO: 33.2 PG (ref 27–31)
MCHC RBC AUTO-ENTMCNC: 31.4 G/DL (ref 33–37)
MCV RBC AUTO: 105.9 FL (ref 80–94)
MONOCYTES # BLD AUTO: 0.4 10*3/UL (ref 0.1–1)
MONOCYTES NFR BLD MANUAL: 6.1 % (ref 3–9)
MUCOUS THREADS URNS QL MICRO: (no result)
NEUT #: 4.7 10*3/UL (ref 2.3–7.9)
NEUT %: 71.3 % (ref 47–73)
NITRITE UR QL STRIP: NEGATIVE
NRBC BLD QL AUTO: 0 % (ref 0–0)
PH UR STRIP: 6.5 [PH] (ref 5–9)
PLATELET # BLD AUTO: 223 10*3/UL (ref 130–400)
PMV BLD AUTO: 9.8 FL (ref 9.6–12.3)
POTASSIUM SERPL-SCNC: 3.7 MMOL/L (ref 3.5–5.1)
PROT SERPL-MCNC: 7.3 GM/DL (ref 6.4–8.2)
RBC # BLD AUTO: 3.4 10*6/UL (ref 4.5–5.9)
RBC #/AREA URNS HPF: (no result) RBC/HPF (ref 0–2)
SODIUM SERPL-SCNC: 142 MMOL/L (ref 136–145)
SP GR UR: 1.02 (ref 1–1.03)
T4 FREE SERPL-MCNC: 0.87 NG/DL (ref 0.76–1.46)
TRIGL SERPL-MCNC: 77 MG/DL (ref ?–150)
TSH SERPL DL<=0.005 MIU/L-ACNC: 2 UIU/ML (ref 0.36–4.75)
UROBILINOGEN UR STRIP-MCNC: 0.2 E.U./DL (ref 0.2–1)
VITAMIN B12: 339 PG/ML (ref 247–911)
VLDLC SERPL CALC-MCNC: 15 MG/DL (ref 6–40)
WBC #/AREA URNS HPF: (no result) WBC/HPF (ref 0–5)
WBC NRBC COR # BLD AUTO: 6.6 10*3/UL (ref 4.8–10.8)

## 2020-07-06 NOTE — NUR
PATIENT RESTING QUIETLY IN BED. NO COMPLAINTS AT THIS TIME. CALL LIGHT WITHIN
REACH. WILL CONTINUE TO MONITOR.

## 2020-07-06 NOTE — NUR
in to talk to patient.
Patient states lives at home with wife.
There are 12 steps in the home.
Physician: donovan
Pharmacy: [facility] services: [facility]
Patient's level of ADLs: INDEPENDENT
Patient has working utilities: all working
DME: none
Follow-up physician's appointment after d/c: will be made by hospitalist nurse
director upon discharge
Does patient want to access PORTAL?: no
Discharge plan discussed with patient he states he is living at home with his
wife, daughter and her , he stated his wife is currently in a local
nursing facility for rehab and will be returning home when discharged.  he
states he is independent in adls and ambulation, he doesn't drive but uses the
cab services when he needs to go somewhere, he states local places he walks to
.he stated he would be returning home when discharged, case management will
notify [facility] when he is discharged,.
 
LYNN POPE

## 2020-07-06 NOTE — NUR
case management spoke to Wilmer from [facility], informed him case managementfaxed
patient's resume order and patient information [facility] will resume home health
services when patient is discharged

## 2020-07-06 NOTE — NUR
PT ASLEEP IN BED AT THIS TIME. NO S/S OF DISTRESS. RESPS EASY AND NONLABORED.
BED IS LOW, CALL LIGHT WITHIN REACH. WILL CONTINUE TO MONITOR.

## 2020-07-06 NOTE — NUR
PT RESTING IN BED. VOICES NO CONCERNS AT THIS TIME.RESPS EASY AND NON LABORED.
NO S/S OF DISTRESS NOTED. VSS.WHITE BOARD UPDATED. POC DISCUSSED W PT. CALL
LIGHT WITHIN REACH. BED ALARM ON. PT DENIES DIZZINESS/SOB. WILL CONTINUE TO
MONITOR.

## 2020-07-07 VITALS — DIASTOLIC BLOOD PRESSURE: 68 MMHG | SYSTOLIC BLOOD PRESSURE: 158 MMHG

## 2020-07-07 VITALS — DIASTOLIC BLOOD PRESSURE: 78 MMHG | SYSTOLIC BLOOD PRESSURE: 150 MMHG

## 2020-07-07 VITALS — DIASTOLIC BLOOD PRESSURE: 76 MMHG

## 2020-07-07 NOTE — NUR
case management visits with patient, he will [facility]
notified that patient will be discharge, no other needs at this time

## 2020-07-07 NOTE — NUR
Discharge instructions reviewed with patient/family. Patient receptive and
verbalizes understanding. Follow-up care arranged. Written instructions given
to patient/family.
HEATHER MOREL
The Discharge Plan/Instructions have been completed.
Hep Lock discontinued.
Site asymptomatic. Pressure applied. Sterile dressing applied.
HEATHER MOREL

## 2020-07-07 NOTE — NUR
PT RESTING IN BED. VOICES NO CONCERNS AT THIS TIME. RESPS EASY AND NON
LABORED. NO S/S OF DISTRESS NOTED.VSS. WHITE BOARD UPDATED. POC DISCUSSED W
PT. CALL LIGHT WITHIN REACH. BED ALARM ON. DENIES DIZZINESS. A/O X3. PT STATES
HE WANTS TO GO HOME.

## 2020-08-11 ENCOUNTER — HOSPITAL ENCOUNTER (EMERGENCY)
Dept: HOSPITAL 83 - ED | Age: 80
Discharge: HOME | End: 2020-08-11
Payer: COMMERCIAL

## 2020-08-11 VITALS — HEIGHT: 66.97 IN | BODY MASS INDEX: 36.88 KG/M2 | WEIGHT: 235 LBS

## 2020-08-11 VITALS — BODY MASS INDEX: 35.36 KG/M2 | HEIGHT: 65.98 IN | WEIGHT: 220 LBS

## 2020-08-11 DIAGNOSIS — Z79.899: ICD-10-CM

## 2020-08-11 DIAGNOSIS — F41.9: Primary | ICD-10-CM

## 2020-08-11 DIAGNOSIS — Z00.00: Primary | ICD-10-CM

## 2020-08-11 LAB
APPEARANCE UR: CLEAR
BILIRUB UR QL STRIP: NEGATIVE
CASTS URNS QL MICRO: (no result)
COLOR UR: YELLOW
EPI CELLS #/AREA URNS HPF: (no result) /[HPF]
GLUCOSE UR QL: NEGATIVE
HGB UR QL STRIP: NEGATIVE
KETONES UR QL STRIP: NEGATIVE
LEUKOCYTE ESTERASE UR QL STRIP: NEGATIVE
MUCOUS THREADS URNS QL MICRO: (no result)
NITRITE UR QL STRIP: NEGATIVE
PH UR STRIP: 6 [PH] (ref 5–9)
RBC #/AREA URNS HPF: (no result) RBC/HPF (ref 0–2)
SP GR UR: 1.01 (ref 1–1.03)
UROBILINOGEN UR STRIP-MCNC: 0.2 E.U./DL (ref 0.2–1)
WBC #/AREA URNS HPF: (no result) WBC/HPF (ref 0–5)

## 2020-12-05 ENCOUNTER — HOSPITAL ENCOUNTER (EMERGENCY)
Dept: HOSPITAL 83 - ED | Age: 80
Discharge: ANOTHER HOSPITAL | End: 2020-12-05
Payer: COMMERCIAL

## 2020-12-05 VITALS — HEIGHT: 60 IN
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
## 2020-12-05 DIAGNOSIS — S06.5X0A: Primary | ICD-10-CM
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
## 2020-12-05 DIAGNOSIS — Z79.899: ICD-10-CM

## 2020-12-05 LAB
ALBUMIN SERPL-MCNC: 3 GM/DL (ref 3.1–4.5)
ALP SERPL-CCNC: 127 U/L (ref 45–117)
ALT SERPL W P-5'-P-CCNC: 28 U/L (ref 12–78)
AST SERPL-CCNC: 27 IU/L (ref 3–35)
BASOPHILS # BLD AUTO: 0.1 10*3/UL (ref 0–0.1)
BASOPHILS NFR BLD AUTO: 0.6 % (ref 0–1)
BUN SERPL-MCNC: 21 MG/DL (ref 7–24)
CHLORIDE SERPL-SCNC: 109 MMOL/L (ref 98–107)
CREAT SERPL-MCNC: 1.01 MG/DL (ref 0.7–1.3)
EOSINOPHIL # BLD AUTO: 0.1 10*3/UL (ref 0–0.4)
EOSINOPHIL # BLD AUTO: 1.3 % (ref 1–4)
ERYTHROCYTE [DISTWIDTH] IN BLOOD BY AUTOMATED COUNT: 15.9 % (ref 0–14.5)
HCT VFR BLD AUTO: 38.2 % (ref 42–52)
LYMPHOCYTES # BLD AUTO: 1.7 10*3/UL (ref 1.3–4.4)
LYMPHOCYTES NFR BLD AUTO: 22.3 % (ref 27–41)
MCH RBC QN AUTO: 32.7 PG (ref 27–31)
MCHC RBC AUTO-ENTMCNC: 31.7 G/DL (ref 33–37)
MCV RBC AUTO: 103.2 FL (ref 80–94)
MONOCYTES # BLD AUTO: 0.4 10*3/UL (ref 0.1–1)
MONOCYTES NFR BLD MANUAL: 5.2 % (ref 3–9)
NEUT #: 5.4 10*3/UL (ref 2.3–7.9)
NEUT %: 69.7 % (ref 47–73)
NRBC BLD QL AUTO: 0 % (ref 0–0)
PLATELET # BLD AUTO: 240 10*3/UL (ref 130–400)
PMV BLD AUTO: 9.9 FL (ref 9.6–12.3)
POTASSIUM SERPL-SCNC: 3.9 MMOL/L (ref 3.5–5.1)
PROT SERPL-MCNC: 7.5 GM/DL (ref 6.4–8.2)
RBC # BLD AUTO: 3.7 10*6/UL (ref 4.5–5.9)
SODIUM SERPL-SCNC: 143 MMOL/L (ref 136–145)
WBC NRBC COR # BLD AUTO: 7.7 10*3/UL (ref 4.8–10.8)
